# Patient Record
Sex: FEMALE | Race: WHITE | NOT HISPANIC OR LATINO | Employment: STUDENT | URBAN - NONMETROPOLITAN AREA
[De-identification: names, ages, dates, MRNs, and addresses within clinical notes are randomized per-mention and may not be internally consistent; named-entity substitution may affect disease eponyms.]

---
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
## 2022-01-19 ENCOUNTER — TELEPHONE (OUTPATIENT)
Dept: OBSTETRICS AND GYNECOLOGY | Facility: CLINIC | Age: 22
End: 2022-01-19

## 2022-06-25 ENCOUNTER — HOSPITAL ENCOUNTER (EMERGENCY)
Facility: HOSPITAL | Age: 22
Discharge: HOME | End: 2022-06-25

## 2022-06-25 VITALS
OXYGEN SATURATION: 97 % | HEART RATE: 98 BPM | DIASTOLIC BLOOD PRESSURE: 61 MMHG | TEMPERATURE: 98.3 F | RESPIRATION RATE: 18 BRPM | SYSTOLIC BLOOD PRESSURE: 106 MMHG

## 2022-06-25 PROCEDURE — 99211 OFF/OP EST MAY X REQ PHY/QHP: CPT

## 2022-12-06 ENCOUNTER — OFFICE VISIT (OUTPATIENT)
Dept: OBSTETRICS AND GYNECOLOGY | Facility: CLINIC | Age: 22
End: 2022-12-06

## 2022-12-06 VITALS
WEIGHT: 163 LBS | BODY MASS INDEX: 27.83 KG/M2 | DIASTOLIC BLOOD PRESSURE: 72 MMHG | SYSTOLIC BLOOD PRESSURE: 118 MMHG | HEIGHT: 64 IN

## 2022-12-06 DIAGNOSIS — N92.6 IRREGULAR PERIODS: Primary | ICD-10-CM

## 2022-12-06 PROBLEM — O09.33 INSUFFICIENT PRENATAL CARE IN THIRD TRIMESTER: Status: RESOLVED | Noted: 2020-01-16 | Resolved: 2022-12-06

## 2022-12-06 PROBLEM — A74.9 CHLAMYDIA INFECTION DURING PREGNANCY: Status: RESOLVED | Noted: 2020-01-16 | Resolved: 2022-12-06

## 2022-12-06 PROBLEM — O99.820 GBS (GROUP B STREPTOCOCCUS CARRIER), +RV CULTURE, CURRENTLY PREGNANT: Status: RESOLVED | Noted: 2020-05-09 | Resolved: 2022-12-06

## 2022-12-06 PROBLEM — O99.333 TOBACCO SMOKING AFFECTING PREGNANCY IN THIRD TRIMESTER: Status: RESOLVED | Noted: 2020-01-17 | Resolved: 2022-12-06

## 2022-12-06 PROBLEM — O09.93 SUPERVISION OF HIGH RISK PREGNANCY IN THIRD TRIMESTER: Status: RESOLVED | Noted: 2020-01-17 | Resolved: 2022-12-06

## 2022-12-06 PROBLEM — O98.313 GENITAL HERPES AFFECTING PREGNANCY IN THIRD TRIMESTER: Status: RESOLVED | Noted: 2020-01-16 | Resolved: 2022-12-06

## 2022-12-06 PROBLEM — O36.5930 POOR FETAL GROWTH AFFECTING MANAGEMENT OF MOTHER IN THIRD TRIMESTER: Status: RESOLVED | Noted: 2020-01-16 | Resolved: 2022-12-06

## 2022-12-06 PROBLEM — A60.09 GENITAL HERPES AFFECTING PREGNANCY IN THIRD TRIMESTER: Status: RESOLVED | Noted: 2020-01-16 | Resolved: 2022-12-06

## 2022-12-06 PROBLEM — O99.213 OBESITY AFFECTING PREGNANCY IN THIRD TRIMESTER: Status: RESOLVED | Noted: 2020-05-07 | Resolved: 2022-12-06

## 2022-12-06 PROBLEM — Z3A.35 35 WEEKS GESTATION OF PREGNANCY: Status: RESOLVED | Noted: 2020-05-11 | Resolved: 2022-12-06

## 2022-12-06 PROBLEM — O98.819 CHLAMYDIA INFECTION DURING PREGNANCY: Status: RESOLVED | Noted: 2020-01-16 | Resolved: 2022-12-06

## 2022-12-06 PROBLEM — O35.FXX0 OMPHALOCELE OF FETUS IN SINGLETON PREGNANCY, ANTEPARTUM: Status: RESOLVED | Noted: 2020-01-16 | Resolved: 2022-12-06

## 2022-12-06 PROBLEM — O99.810 GLUCOSE INTOLERANCE OF PREGNANCY: Status: RESOLVED | Noted: 2020-05-08 | Resolved: 2022-12-06

## 2022-12-06 PROBLEM — O99.323 DRUG USE AFFECTING PREGNANCY IN THIRD TRIMESTER: Status: RESOLVED | Noted: 2020-01-17 | Resolved: 2022-12-06

## 2022-12-06 PROCEDURE — 99213 OFFICE O/P EST LOW 20 MIN: CPT | Performed by: NURSE PRACTITIONER

## 2022-12-06 NOTE — PROGRESS NOTES
Subjective   Steve Alanna Fried is a 22 y.o. period issues      History of Present Illness  LMP: around 2 weeks ago  Pap: never  BC: none    Pt presents with complaints of missing periods.  She reports for the past four months spotting once monthly instead of typical moderate menstrual flow.  She reports unintentional weight gain of around 15 pounds over the last couple of months.  She and significant other are trying to conceive. They have been trying for the past few months.  She has one child conceived without issue and her partner has 6 children.  No medications or elicit drug use.        The following portions of the patient's history were reviewed and updated as appropriate: allergies, current medications, past family history, past medical history, past social history, past surgical history and problem list.    Review of Systems   Constitutional: Negative for chills, diaphoresis, fatigue, fever and unexpected weight change.   Respiratory: Negative for apnea, chest tightness and shortness of breath.    Cardiovascular: Negative for chest pain and palpitations.   Gastrointestinal: Negative for abdominal distention, abdominal pain, constipation and diarrhea.   Genitourinary: Positive for menstrual problem. Negative for decreased urine volume, difficulty urinating, dysuria, enuresis, flank pain, frequency, genital sores, hematuria, pelvic pain, urgency, vaginal bleeding, vaginal discharge and vaginal pain.   Skin: Negative for rash.   Neurological: Negative for headaches.   Psychiatric/Behavioral: Negative for sleep disturbance and suicidal ideas.         Objective   Physical Exam  Vitals and nursing note reviewed.   Constitutional:       General: She is awake. She is not in acute distress.     Appearance: Normal appearance. She is well-developed and well-groomed. She is not ill-appearing, toxic-appearing or diaphoretic.   Cardiovascular:      Rate and Rhythm: Normal rate and regular rhythm.      Heart  sounds: Normal heart sounds.   Pulmonary:      Effort: Pulmonary effort is normal.      Breath sounds: Normal breath sounds.   Skin:     General: Skin is warm and dry.   Neurological:      Mental Status: She is alert and oriented to person, place, and time.   Psychiatric:         Attention and Perception: Attention and perception normal.         Mood and Affect: Mood and affect normal.         Speech: Speech normal.         Behavior: Behavior normal. Behavior is cooperative.           Assessment & Plan   Diagnoses and all orders for this visit:    1. Irregular periods (Primary)  -     Comprehensive Metabolic Panel  -     Hemoglobin A1c  -     TSH  -     Testosterone, F Eqlib & T LC / MS  -     Lipid Panel  -     Follicle Stimulating Hormone  -     Luteinizing Hormone  -     HCG, Serum, Qualitative; Future  -     Prolactin    Encouraged pt to track cycles with smart phone nereyda.  Ovulation predictor kits recommended along with timed intercourse.  Start taking prenatal vitamins.  Encouraged healthy diet and regular physical activity with goal of weight loss.  Weight loss of 15 pounds may help with menstrual regulation.  Encouraged complete smoking cessation and limitation of alcohol consumption.  It typically takes 6 months or more for most couples to conceive.  Try to limit stress and take time for yourself and partner.      The semen analysis of the most effective way to evaluate for male factor infertility, which is the cause of approximately 40% of infertility cases.  We will place the order in the sample was evaluated for 3 parameters; the total number of sperm, the motility of the sperm as well as the morphology of the sperm of the microscope.  If the semen analysis is normal, and there is no further work-up to be done for the male partner.  If the semen analysis is abnormal, after an evaluation by urology as recommended.    Fasting labs ordered.  She will come have them done in am.  Will call with results when  they are available.

## 2022-12-07 ENCOUNTER — LAB (OUTPATIENT)
Dept: LAB | Facility: HOSPITAL | Age: 22
End: 2022-12-07

## 2022-12-07 DIAGNOSIS — N92.6 IRREGULAR PERIODS: ICD-10-CM

## 2022-12-07 LAB
ALBUMIN SERPL-MCNC: 4.2 G/DL (ref 3.5–5.2)
ALBUMIN/GLOB SERPL: 1.5 G/DL
ALP SERPL-CCNC: 54 U/L (ref 39–117)
ALT SERPL W P-5'-P-CCNC: 16 U/L (ref 1–33)
ANION GAP SERPL CALCULATED.3IONS-SCNC: 9.9 MMOL/L (ref 5–15)
AST SERPL-CCNC: 13 U/L (ref 1–32)
BILIRUB SERPL-MCNC: 0.3 MG/DL (ref 0–1.2)
BUN SERPL-MCNC: 16 MG/DL (ref 6–20)
BUN/CREAT SERPL: 21.9 (ref 7–25)
CALCIUM SPEC-SCNC: 8.9 MG/DL (ref 8.6–10.5)
CHLORIDE SERPL-SCNC: 102 MMOL/L (ref 98–107)
CHOLEST SERPL-MCNC: 128 MG/DL (ref 0–200)
CO2 SERPL-SCNC: 25.1 MMOL/L (ref 22–29)
CREAT SERPL-MCNC: 0.73 MG/DL (ref 0.57–1)
EGFRCR SERPLBLD CKD-EPI 2021: 120.9 ML/MIN/1.73
FSH SERPL-ACNC: 3.31 MIU/ML
GLOBULIN UR ELPH-MCNC: 2.8 GM/DL
GLUCOSE SERPL-MCNC: 100 MG/DL (ref 65–99)
HBA1C MFR BLD: 5.9 % (ref 4.8–5.6)
HCG SERPL QL: NEGATIVE
HDLC SERPL-MCNC: 57 MG/DL (ref 40–60)
LDLC SERPL CALC-MCNC: 60 MG/DL (ref 0–100)
LDLC/HDLC SERPL: 1.09 {RATIO}
LH SERPL-ACNC: 4.91 MIU/ML
POTASSIUM SERPL-SCNC: 4.3 MMOL/L (ref 3.5–5.2)
PROLACTIN SERPL-MCNC: 10.9 NG/ML (ref 4.79–23.3)
PROT SERPL-MCNC: 7 G/DL (ref 6–8.5)
SODIUM SERPL-SCNC: 137 MMOL/L (ref 136–145)
TRIGL SERPL-MCNC: 45 MG/DL (ref 0–150)
TSH SERPL DL<=0.05 MIU/L-ACNC: 1.69 UIU/ML (ref 0.27–4.2)
VLDLC SERPL-MCNC: 11 MG/DL (ref 5–40)

## 2022-12-07 PROCEDURE — 80061 LIPID PANEL: CPT | Performed by: NURSE PRACTITIONER

## 2022-12-07 PROCEDURE — 84443 ASSAY THYROID STIM HORMONE: CPT | Performed by: NURSE PRACTITIONER

## 2022-12-07 PROCEDURE — 84403 ASSAY OF TOTAL TESTOSTERONE: CPT | Performed by: NURSE PRACTITIONER

## 2022-12-07 PROCEDURE — 84703 CHORIONIC GONADOTROPIN ASSAY: CPT

## 2022-12-07 PROCEDURE — 83036 HEMOGLOBIN GLYCOSYLATED A1C: CPT | Performed by: NURSE PRACTITIONER

## 2022-12-07 PROCEDURE — 83001 ASSAY OF GONADOTROPIN (FSH): CPT | Performed by: NURSE PRACTITIONER

## 2022-12-07 PROCEDURE — 84402 ASSAY OF FREE TESTOSTERONE: CPT | Performed by: NURSE PRACTITIONER

## 2022-12-07 PROCEDURE — 80053 COMPREHEN METABOLIC PANEL: CPT | Performed by: NURSE PRACTITIONER

## 2022-12-07 PROCEDURE — 83002 ASSAY OF GONADOTROPIN (LH): CPT | Performed by: NURSE PRACTITIONER

## 2022-12-07 PROCEDURE — 84146 ASSAY OF PROLACTIN: CPT | Performed by: NURSE PRACTITIONER

## 2022-12-07 PROCEDURE — 36415 COLL VENOUS BLD VENIPUNCTURE: CPT

## 2022-12-15 DIAGNOSIS — R73.03 PREDIABETES: ICD-10-CM

## 2022-12-15 DIAGNOSIS — Z78.9 TRYING TO GET PREGNANT: Primary | ICD-10-CM

## 2022-12-15 LAB
TESTOST FREE MFR SERPL: 1.9 % (ref 0.5–2.8)
TESTOST FREE SERPL-MCNC: 0.44 NG/DL (ref 0.1–0.85)
TESTOST SERPL-MCNC: 23.3 NG/DL (ref 10–55)

## 2022-12-15 RX ORDER — METFORMIN HYDROCHLORIDE 750 MG/1
750 TABLET, EXTENDED RELEASE ORAL
Qty: 30 TABLET | Refills: 5 | Status: SHIPPED | OUTPATIENT
Start: 2022-12-15 | End: 2023-01-25 | Stop reason: ALTCHOICE

## 2023-01-25 ENCOUNTER — INITIAL PRENATAL (OUTPATIENT)
Dept: OBSTETRICS AND GYNECOLOGY | Facility: CLINIC | Age: 23
End: 2023-01-25
Payer: COMMERCIAL

## 2023-01-25 ENCOUNTER — LAB (OUTPATIENT)
Dept: LAB | Facility: HOSPITAL | Age: 23
End: 2023-01-25
Payer: COMMERCIAL

## 2023-01-25 VITALS — DIASTOLIC BLOOD PRESSURE: 64 MMHG | BODY MASS INDEX: 28.73 KG/M2 | WEIGHT: 167.4 LBS | SYSTOLIC BLOOD PRESSURE: 108 MMHG

## 2023-01-25 DIAGNOSIS — Z32.00 PREGNANCY EXAMINATION OR TEST, PREGNANCY UNCONFIRMED: ICD-10-CM

## 2023-01-25 DIAGNOSIS — O36.80X0 ENCOUNTER TO DETERMINE FETAL VIABILITY OF PREGNANCY, SINGLE OR UNSPECIFIED FETUS: ICD-10-CM

## 2023-01-25 DIAGNOSIS — F17.210 CIGARETTE SMOKER: ICD-10-CM

## 2023-01-25 DIAGNOSIS — Z34.80 PRENATAL CARE OF MULTIGRAVIDA, ANTEPARTUM: Primary | ICD-10-CM

## 2023-01-25 DIAGNOSIS — Z32.01 POSITIVE PREGNANCY TEST: ICD-10-CM

## 2023-01-25 DIAGNOSIS — Z78.9 DATE OF LAST MENSTRUAL PERIOD (LMP) UNKNOWN: ICD-10-CM

## 2023-01-25 DIAGNOSIS — F12.91 HISTORY OF MARIJUANA USE: ICD-10-CM

## 2023-01-25 DIAGNOSIS — Z86.32 HISTORY OF GESTATIONAL DIABETES: ICD-10-CM

## 2023-01-25 PROBLEM — R82.5 POSITIVE URINE DRUG SCREEN: Status: ACTIVE | Noted: 2020-01-17

## 2023-01-25 PROBLEM — O35.9XX0 FETAL ABNORMALITY AFFECTING MANAGEMENT OF MOTHER: Status: ACTIVE | Noted: 2020-01-16

## 2023-01-25 PROBLEM — Z34.90 PATIENT CURRENTLY PREGNANT: Status: ACTIVE | Noted: 2020-01-17

## 2023-01-25 PROBLEM — D62 POSTOPERATIVE ANEMIA DUE TO ACUTE BLOOD LOSS: Status: ACTIVE | Noted: 2020-05-28

## 2023-01-25 PROBLEM — O09.899 HISTORY OF MATERNAL CHLAMYDIA INFECTION, CURRENTLY PREGNANT: Status: ACTIVE | Noted: 2020-02-17

## 2023-01-25 LAB
ABO GROUP BLD: NORMAL
AMPHET+METHAMPHET UR QL: POSITIVE
AMPHETAMINES UR QL: POSITIVE
B-HCG UR QL: POSITIVE
BARBITURATES UR QL SCN: NEGATIVE
BASOPHILS # BLD AUTO: 0.03 10*3/MM3 (ref 0–0.2)
BASOPHILS NFR BLD AUTO: 0.3 % (ref 0–1.5)
BENZODIAZ UR QL SCN: NEGATIVE
BILIRUB UR QL STRIP: NEGATIVE
BLD GP AB SCN SERPL QL: NEGATIVE
BUPRENORPHINE SERPL-MCNC: NEGATIVE NG/ML
CANNABINOIDS SERPL QL: POSITIVE
CLARITY UR: ABNORMAL
COCAINE UR QL: NEGATIVE
COLOR UR: YELLOW
DEPRECATED RDW RBC AUTO: 39.6 FL (ref 37–54)
EOSINOPHIL # BLD AUTO: 0.07 10*3/MM3 (ref 0–0.4)
EOSINOPHIL NFR BLD AUTO: 0.7 % (ref 0.3–6.2)
ERYTHROCYTE [DISTWIDTH] IN BLOOD BY AUTOMATED COUNT: 12.5 % (ref 12.3–15.4)
EXPIRATION DATE: ABNORMAL
GLUCOSE UR STRIP-MCNC: NEGATIVE MG/DL
HBV SURFACE AG SERPL QL IA: NORMAL
HCG INTACT+B SERPL-ACNC: NORMAL MIU/ML
HCT VFR BLD AUTO: 35.4 % (ref 34–46.6)
HCV AB SER DONR QL: NORMAL
HGB BLD-MCNC: 12.1 G/DL (ref 12–15.9)
HGB UR QL STRIP.AUTO: NEGATIVE
HIV1+2 AB SER QL: NORMAL
IMM GRANULOCYTES # BLD AUTO: 0.08 10*3/MM3 (ref 0–0.05)
IMM GRANULOCYTES NFR BLD AUTO: 0.8 % (ref 0–0.5)
INTERNAL NEGATIVE CONTROL: NEGATIVE
INTERNAL POSITIVE CONTROL: POSITIVE
KETONES UR QL STRIP: NEGATIVE
LEUKOCYTE ESTERASE UR QL STRIP.AUTO: NEGATIVE
LYMPHOCYTES # BLD AUTO: 3.35 10*3/MM3 (ref 0.7–3.1)
LYMPHOCYTES NFR BLD AUTO: 31.8 % (ref 19.6–45.3)
Lab: ABNORMAL
Lab: NORMAL
MCH RBC QN AUTO: 30 PG (ref 26.6–33)
MCHC RBC AUTO-ENTMCNC: 34.2 G/DL (ref 31.5–35.7)
MCV RBC AUTO: 87.6 FL (ref 79–97)
METHADONE UR QL SCN: NEGATIVE
MONOCYTES # BLD AUTO: 0.64 10*3/MM3 (ref 0.1–0.9)
MONOCYTES NFR BLD AUTO: 6.1 % (ref 5–12)
NEUTROPHILS NFR BLD AUTO: 6.36 10*3/MM3 (ref 1.7–7)
NEUTROPHILS NFR BLD AUTO: 60.3 % (ref 42.7–76)
NITRITE UR QL STRIP: NEGATIVE
NRBC BLD AUTO-RTO: 0 /100 WBC (ref 0–0.2)
OPIATES UR QL: NEGATIVE
OXYCODONE UR QL SCN: NEGATIVE
PCP UR QL SCN: NEGATIVE
PH UR STRIP.AUTO: 8 [PH] (ref 5–8)
PLATELET # BLD AUTO: 310 10*3/MM3 (ref 140–450)
PMV BLD AUTO: 9.5 FL (ref 6–12)
PROPOXYPH UR QL: NEGATIVE
PROT UR QL STRIP: ABNORMAL
RBC # BLD AUTO: 4.04 10*6/MM3 (ref 3.77–5.28)
RH BLD: POSITIVE
RPR SER QL: NORMAL
SP GR UR STRIP: 1.03 (ref 1–1.03)
TRICYCLICS UR QL SCN: NEGATIVE
UROBILINOGEN UR QL STRIP: ABNORMAL
WBC NRBC COR # BLD: 10.53 10*3/MM3 (ref 3.4–10.8)

## 2023-01-25 PROCEDURE — 87086 URINE CULTURE/COLONY COUNT: CPT | Performed by: NURSE PRACTITIONER

## 2023-01-25 PROCEDURE — 80306 DRUG TEST PRSMV INSTRMNT: CPT | Performed by: NURSE PRACTITIONER

## 2023-01-25 PROCEDURE — 87591 N.GONORRHOEAE DNA AMP PROB: CPT | Performed by: NURSE PRACTITIONER

## 2023-01-25 PROCEDURE — 87661 TRICHOMONAS VAGINALIS AMPLIF: CPT | Performed by: NURSE PRACTITIONER

## 2023-01-25 PROCEDURE — G0480 DRUG TEST DEF 1-7 CLASSES: HCPCS | Performed by: NURSE PRACTITIONER

## 2023-01-25 PROCEDURE — 36415 COLL VENOUS BLD VENIPUNCTURE: CPT

## 2023-01-25 PROCEDURE — 81025 URINE PREGNANCY TEST: CPT | Performed by: NURSE PRACTITIONER

## 2023-01-25 PROCEDURE — 86803 HEPATITIS C AB TEST: CPT | Performed by: NURSE PRACTITIONER

## 2023-01-25 PROCEDURE — 84702 CHORIONIC GONADOTROPIN TEST: CPT | Performed by: NURSE PRACTITIONER

## 2023-01-25 PROCEDURE — 87491 CHLMYD TRACH DNA AMP PROBE: CPT | Performed by: NURSE PRACTITIONER

## 2023-01-25 PROCEDURE — 81003 URINALYSIS AUTO W/O SCOPE: CPT | Performed by: NURSE PRACTITIONER

## 2023-01-25 PROCEDURE — 80081 OBSTETRIC PANEL INC HIV TSTG: CPT | Performed by: NURSE PRACTITIONER

## 2023-01-25 RX ORDER — ALBUTEROL SULFATE 90 UG/1
2 AEROSOL, METERED RESPIRATORY (INHALATION) EVERY 4 HOURS PRN
COMMUNITY

## 2023-01-25 RX ORDER — PRENATAL VIT NO.126/IRON/FOLIC 28MG-0.8MG
TABLET ORAL DAILY
COMMUNITY

## 2023-01-26 LAB
BACTERIA SPEC AEROBE CULT: NO GROWTH
C TRACH RRNA CVX QL NAA+PROBE: NEGATIVE
N GONORRHOEA RRNA SPEC QL NAA+PROBE: NEGATIVE
RUBV IGG SERPL IA-ACNC: 3.32 INDEX
TRICHOMONAS VAGINALIS PCR: NEGATIVE

## 2023-01-30 LAB
CANNABINOIDS UR QL CFM: NORMAL
CARBOXYTHC/CREAT UR: 136 NG/MG CREAT
LEVEL OF DETECTION:: NORMAL

## 2023-02-03 LAB
AMPHET/CREAT UR: 522 NG/MG CREAT
AMPHETAMINES UR QL CFM: NORMAL
LEVEL OF DETECTION:: NORMAL
MDA/CREAT UR: NOT DETECTED NG/MG CREAT
MDMA/CREAT UR: NOT DETECTED NG/MG CREAT
METHAMPHET/CREAT UR: 1858 NG/MG CREAT

## 2023-03-30 ENCOUNTER — REFERRAL TRIAGE (OUTPATIENT)
Dept: LABOR AND DELIVERY | Facility: HOSPITAL | Age: 23
End: 2023-03-30
Payer: COMMERCIAL

## 2023-04-10 ENCOUNTER — TELEPHONE (OUTPATIENT)
Dept: OBSTETRICS AND GYNECOLOGY | Facility: CLINIC | Age: 23
End: 2023-04-10
Payer: COMMERCIAL

## 2023-04-10 NOTE — TELEPHONE ENCOUNTER
----- Message from SAWYER Moulton sent at 4/5/2023 12:33 PM CDT -----  Can we reach out to pt to reschedule her appt. No Showed yesterday.   ----- Message -----  From: Tiki Cruz APRN  Sent: 3/30/2023   5:20 PM CDT  To: SAWYER Moulton    Pt has an appt with you on 04/04  ----- Message -----  From: Interface, Rad Results [place] In  Sent: 3/30/2023   4:27 AM CDT  To: SAWYER Parks

## 2023-04-19 ENCOUNTER — INITIAL PRENATAL (OUTPATIENT)
Dept: OBSTETRICS AND GYNECOLOGY | Facility: CLINIC | Age: 23
End: 2023-04-19
Payer: COMMERCIAL

## 2023-04-19 ENCOUNTER — LAB (OUTPATIENT)
Dept: LAB | Facility: HOSPITAL | Age: 23
End: 2023-04-19
Payer: COMMERCIAL

## 2023-04-19 VITALS — BODY MASS INDEX: 30.73 KG/M2 | SYSTOLIC BLOOD PRESSURE: 112 MMHG | WEIGHT: 179 LBS | DIASTOLIC BLOOD PRESSURE: 58 MMHG

## 2023-04-19 DIAGNOSIS — N75.1 BARTHOLIN'S GLAND ABSCESS: ICD-10-CM

## 2023-04-19 DIAGNOSIS — Z86.19 HISTORY OF HERPES SIMPLEX INFECTION: ICD-10-CM

## 2023-04-19 DIAGNOSIS — M43.17 SPONDYLOLISTHESIS AT L5-S1 LEVEL: ICD-10-CM

## 2023-04-19 DIAGNOSIS — Z98.891 HISTORY OF CLASSICAL CESAREAN SECTION: ICD-10-CM

## 2023-04-19 DIAGNOSIS — O09.32 INSUFFICIENT PRENATAL CARE IN SECOND TRIMESTER: ICD-10-CM

## 2023-04-19 DIAGNOSIS — O09.42 HIGH RISK MULTIGRAVIDA IN SECOND TRIMESTER: ICD-10-CM

## 2023-04-19 DIAGNOSIS — O99.322 DRUG USE AFFECTING PREGNANCY IN SECOND TRIMESTER: ICD-10-CM

## 2023-04-19 DIAGNOSIS — Z3A.22 22 WEEKS GESTATION OF PREGNANCY: Primary | ICD-10-CM

## 2023-04-19 DIAGNOSIS — Z11.3 SCREEN FOR STD (SEXUALLY TRANSMITTED DISEASE): ICD-10-CM

## 2023-04-19 DIAGNOSIS — Z82.79 FAMILY HISTORY OF BIRTH DEFECTS: ICD-10-CM

## 2023-04-19 PROBLEM — O99.213 OBESITY AFFECTING PREGNANCY IN THIRD TRIMESTER: Status: RESOLVED | Noted: 2020-05-07 | Resolved: 2023-04-19

## 2023-04-19 PROBLEM — O09.899 HISTORY OF MATERNAL CHLAMYDIA INFECTION, CURRENTLY PREGNANT: Status: RESOLVED | Noted: 2020-02-17 | Resolved: 2023-04-19

## 2023-04-19 PROBLEM — Z34.90 PATIENT CURRENTLY PREGNANT: Status: RESOLVED | Noted: 2020-01-17 | Resolved: 2023-04-19

## 2023-04-19 PROBLEM — O23.599 BACTERIAL VAGINOSIS IN PREGNANCY: Status: RESOLVED | Noted: 2020-04-22 | Resolved: 2023-04-19

## 2023-04-19 PROBLEM — D62 POSTOPERATIVE ANEMIA DUE TO ACUTE BLOOD LOSS: Status: RESOLVED | Noted: 2020-05-28 | Resolved: 2023-04-19

## 2023-04-19 PROBLEM — O98.819 CHLAMYDIA INFECTION DURING PREGNANCY: Status: RESOLVED | Noted: 2020-01-16 | Resolved: 2023-04-19

## 2023-04-19 PROBLEM — O99.323 DRUG USE AFFECTING PREGNANCY IN THIRD TRIMESTER: Status: RESOLVED | Noted: 2020-01-17 | Resolved: 2023-04-19

## 2023-04-19 PROBLEM — O99.820 GBS (GROUP B STREPTOCOCCUS CARRIER), +RV CULTURE, CURRENTLY PREGNANT: Status: RESOLVED | Noted: 2020-05-09 | Resolved: 2023-04-19

## 2023-04-19 PROBLEM — O35.9XX0 FETAL ABNORMALITY AFFECTING MANAGEMENT OF MOTHER: Status: RESOLVED | Noted: 2020-01-16 | Resolved: 2023-04-19

## 2023-04-19 PROBLEM — O99.810 GLUCOSE INTOLERANCE OF PREGNANCY: Status: RESOLVED | Noted: 2020-05-08 | Resolved: 2023-04-19

## 2023-04-19 PROBLEM — A74.9 CHLAMYDIA INFECTION DURING PREGNANCY: Status: RESOLVED | Noted: 2020-01-16 | Resolved: 2023-04-19

## 2023-04-19 PROBLEM — B96.89 BACTERIAL VAGINOSIS IN PREGNANCY: Status: RESOLVED | Noted: 2020-04-22 | Resolved: 2023-04-19

## 2023-04-19 LAB
AMPHET+METHAMPHET UR QL: NEGATIVE
AMPHETAMINES UR QL: NEGATIVE
BARBITURATES UR QL SCN: NEGATIVE
BENZODIAZ UR QL SCN: NEGATIVE
BUPRENORPHINE SERPL-MCNC: NEGATIVE NG/ML
CANNABINOIDS SERPL QL: POSITIVE
COCAINE UR QL: NEGATIVE
METHADONE UR QL SCN: NEGATIVE
OPIATES UR QL: NEGATIVE
OXYCODONE UR QL SCN: NEGATIVE
PCP UR QL SCN: NEGATIVE
PROPOXYPH UR QL: NEGATIVE
TRICYCLICS UR QL SCN: NEGATIVE

## 2023-04-19 PROCEDURE — 87661 TRICHOMONAS VAGINALIS AMPLIF: CPT | Performed by: NURSE PRACTITIONER

## 2023-04-19 PROCEDURE — 87491 CHLMYD TRACH DNA AMP PROBE: CPT | Performed by: NURSE PRACTITIONER

## 2023-04-19 PROCEDURE — 87591 N.GONORRHOEAE DNA AMP PROB: CPT | Performed by: NURSE PRACTITIONER

## 2023-04-19 PROCEDURE — 80306 DRUG TEST PRSMV INSTRMNT: CPT | Performed by: NURSE PRACTITIONER

## 2023-04-19 PROCEDURE — G0480 DRUG TEST DEF 1-7 CLASSES: HCPCS | Performed by: NURSE PRACTITIONER

## 2023-04-19 RX ORDER — PRENATAL WITH FERROUS FUM AND FOLIC ACID 3080; 920; 120; 400; 22; 1.84; 3; 20; 10; 1; 12; 200; 27; 25; 2 [IU]/1; [IU]/1; MG/1; [IU]/1; MG/1; MG/1; MG/1; MG/1; MG/1; MG/1; UG/1; MG/1; MG/1; MG/1; MG/1
1 TABLET ORAL DAILY
Qty: 30 TABLET | Refills: 11 | Status: SHIPPED | OUTPATIENT
Start: 2023-04-19

## 2023-04-19 NOTE — PROGRESS NOTES
[facility]  Obstetrics  Date of Service: 2023    CHIEF COMPLAINT:  New prenatal visit    HISTORY OF PRESENT ILLNESS:  Steve Fried is a 22 y.o. y/o  at 22w1d by LMP (No LMP recorded (lmp unknown). Patient is pregnant.).  This was a planned pregnancy and the patient is alone today.  She denies any vaginal bleeding.  She complains of left sided genital mass which has been present since February. She was treated with oral antibiotics while in [facility] with no relief in genital mass.  She needs prenatal vitamins sent into her pharmacy.    REVIEW OF SYSTEMS  Review of Systems   Constitutional: Negative for activity change, appetite change, diaphoresis, fatigue, unexpected weight gain and unexpected weight loss.   Respiratory: Negative for apnea, chest tightness and shortness of breath.    Cardiovascular: Negative for chest pain and palpitations.   Gastrointestinal: Negative for abdominal distention, abdominal pain, constipation and diarrhea.   Genitourinary: Positive for genital sores. Negative for amenorrhea, breast discharge, breast lump, breast pain, decreased libido, decreased urine volume, difficulty urinating, dyspareunia, dysuria, flank pain, frequency, hematuria, pelvic pain, pelvic pressure, urgency, urinary incontinence, vaginal bleeding, vaginal discharge and vaginal pain.   Musculoskeletal: Negative for myalgias.   Skin: Negative for color change, dry skin and skin lesions.   Neurological: Negative for light-headedness and headache.   Psychiatric/Behavioral: Negative for agitation, dysphoric mood, sleep disturbance, depressed mood and stress. The patient is not nervous/anxious.        PRENATAL RISK FACTORS   Problems (from 25 to present)     Problem Noted Resolved    Insufficient prenatal care in second trimester 2023 by Tiki Cruz APRN No    Drug use affecting pregnancy in second trimester 2023 by Tiki Cruz  SAWYER Lutz No    High risk multigravida in second trimester 2023 by Tiki Cruz APRN No    Family history of birth defects 2023 by Tiki Cruz APRN No    History of classical  section 2023 by Tiki Cruz APRN No    History of herpes simplex infection 2020 by Jazmine Astorga MA No    Overview Addendum 2023  1:07 PM by Rosario Fatima RN     HSV IgG positive 20  Hx of being treated for genital HSV - 2019, tx'd in ER at [facility]               Spondylolisthesis at L5-S1 level 2018 by Jazmine Astorga MA No    Overview Addendum 2023  1:07 PM by Rosario Fatima, RN     S/p spinal fusion 2018 ([facility])  S/p spinal fusion 2018 ([facility])                 DATING CRITERIA:  LMP unknown  1TUS (2023 at 19w0d) -- KEKE 2023    OBSTETRIC HISTORY:  OB History    Para Term  AB Living   2 1 1     1   SAB IAB Ectopic Molar Multiple Live Births             1      # Outcome Date GA Lbr Jose Enrique/2nd Weight Sex Delivery Anes PTL Lv   2 Current            1 Term 20 37w3d   M CS-[facility] N [facility]      Complications: Two vessel cord, Fetal omphalocele during pregnancy, antepartum, IUGR (intrauterine growth restriction) affecting care of mother, Gestational diabetes, Tobacco use affecting pregnancy, antepartum     GYN HISTORY:      Last pap smear:   Last Completed Pap Smear     This patient has no relevant Health Maintenance data.        Denies h/o gynecologic surgeries, including biopsies of the cervix    PAST MEDICAL HISTORY:  Past Medical History:   Diagnosis Date   • Anemia    • Anxiety    • Asthma 2013   • Attention deficit disorder 2020    dx'd in 1st grade, previously on adderall dx'd in 1st grade, previously on adderall   • Bipolar disorder    • Chlamydia    • Depression    • Gestational diabetes    • Herpes    • Myopia    • Spondylolisthesis at L5-S1 level 2018    S/p spinal fusion  2018 ([facility])   • Substance abuse      PAST SURGICAL HISTORY:  Past Surgical History:   Procedure Laterality Date   • ADENOIDECTOMY     •  SECTION     • SPINAL FUSION  2017    L5-S1   • TONSILLECTOMY     • WISDOM TOOTH EXTRACTION       FAMILY HISTORY:  Family History   Problem Relation Age of Onset   • Fibromyalgia Mother    • Hypermobility Mother    • Hypertension Mother    • Irregular heart beat Mother    • Bipolar disorder Mother    • ADD / ADHD Mother    • No Known Problems Father    • ADD / ADHD Sister    • Bipolar disorder Sister    • Miscarriages / Stillbirths Sister    • Asthma Brother    • ADD / ADHD Brother    • No Known Problems Maternal Grandmother    • No Known Problems Maternal Grandfather    • Cancer Paternal Grandmother    • Cataracts Paternal Grandmother    • Breast cancer Paternal Grandmother    • Diabetes Paternal Grandfather    • Cancer Paternal Grandfather    • Ovarian cancer Neg Hx    • Uterine cancer Neg Hx    • Colon cancer Neg Hx      SOCIAL HISTORY:  Social History     Socioeconomic History   • Marital status: Single   Tobacco Use   • Smoking status: Every Day     Packs/day: 0.25     Types: Cigarettes   • Smokeless tobacco: Never   • Tobacco comments:     1-2 cigarettes per day    Vaping Use   • Vaping Use: Never used   Substance and Sexual Activity   • Alcohol use: No   • Drug use: Not Currently   • Sexual activity: Yes     Partners: Male     GENETIC SCREENING:  Age >34 yo as of KEKE: no  Thalassemia: no  NTD: no  CHD: no  Down Syndrome/MR/Fragile X/Autism: no  Ashkenazi [employer]an with Robert-Sachs, [place], familial dysautonomia: no  Sickle cell disease or trait: no  Hemophilia: no  Muscular dystrophy: no  Cystic fibrosis: no  Nieves's chorea: no  Birth defects: yes, her son was born with omphacele  Genetic/chromosomal disorders: yes, first cousin has charge syndrome    INFECTION HISTORY:  TB exposure: no  HSV: yes  Illness since LMP: no  Prior GBS infected child: no  STIs: yes,  treated in the past     ALLERGIES:  Allergies   Allergen Reactions   • Prozac [Fluoxetine Hcl] Nausea And Vomiting   • Lyrica [Pregabalin] Rash       MEDICATIONS:  Prior to Admission medications    Medication Sig Start Date End Date Taking? Authorizing Provider   albuterol sulfate  (90 Base) MCG/ACT inhaler Inhale 2 puffs Every 4 (Four) Hours As Needed for Wheezing.   Yes Opal Edmonds MD   prenatal vitamin (prenatal, CLASSIC, vitamin) tablet Take  by mouth Daily.   Yes ProviderOpal MD       PHYSICAL EXAM:   /58   Wt 81.2 kg (179 lb)   LMP  (LMP Unknown)   BMI 30.73 kg/m²   General: Alert, healthy, no distress, well nourished and well developed.  Neurologic: Alert, oriented to person, place, and time.  Gait normal.  Cranial nerves II-XII grossly intact.  HEENT: Normocephalic, atraumatic.  Extraocular muscles intact, pupils equal and reactive x2.    Teeth: Normal hygiene.  Neck: Supple, no adenopathy, thyroid normal size, non-tender, without nodularity, trachea midline.  Lungs: Normal respiratory effort.  Clear to auscultation bilaterally.  No wheezes, rhonci, or rales.  Heart: Regular rate and rhythm.  No murmer, rub or gallop.  Abdomen: Soft, non-tender, non-distended,no masses, no hepatosplenomegaly, no hernia.  Skin: No rash, no lesions.  Extremities: No cyanosis, clubbing or edema.    GYN Exam: Large 5 cm prominent left bartholin gland noted.  Pt agreeable to I&D today.          Incision and Drainage of Left Bartholin Gland Abscess    Risks and Benefits reviewed:  Yes  Allergies reviewed:    Yes  Consent obtained:    Yes, signed     Area cleansed with betadine solution.  11 blade used to make small incision into left bartholin gland.  Quick return of large amount of brown purulent drainage admixed with blood.  Pt tolerated procedure well.    Follow up in clinic for GYN exam in 1 week.            Reviewed preliminary dating/fetal anatomy scan results 03/28/2023: INDICATION: Second  trimester pregnancy. Fetal anatomy survey     FINDINGS:  Single intrauterine gestation with fetus in a cephalic  presentation.  Cervical length 5.2 cm. Internal os appears closed.  Posterior placenta. No definite placenta previa.  A normal amount of amniotic fluid is present with an amniotic  fluid index of 10.4 cm and a maximal vertical pocket depth of 2.2  cm.  Fetal cardiac activity at 141 bpm.  Complete fetal survey unremarkable.  Three-vessel umbilical cord.  May fetus.  Gestational age is determined by biparietal diameter, femur  length and abdominal circumference measurements 19 weeks 0 days  has a corresponding ultrasound KEKE of 2023 and an estimated  fetal weight of 271 g.     IMPRESSION:  Single approximately 19 week intrauterine gestation as above.       IMPRESSION:  Steve Fried is a 22 y.o.  at 22w1d for a new prenatal visit.    PLAN:  1.  IUP at 22w1d  - Prenatal labs reviewed  - Genetic testing, including cystic fibrosis, was discussed and patient declines  - Start prenatal vitamins  - Weight gain counseling performed.   - Pregravid BMI 25-29.9: Recommend 15-25 lb  - Return to clinic in 1 week, then 4 weeks for return prenatal visit  - Reviewed COVID-19 visitation policy  - Reviewed COVID-19 precautions     Diagnosis Plan   1. 22 weeks gestation of pregnancy        2. High risk multigravida in second trimester        3. History of classical  section  Recommend early delivery up to OB/GYN provider recomendations      4. Drug use affecting pregnancy in second trimester  Urine Drug Screen - Urine, Clean Catch    UDS + amp/meth, THC. She denies drug use recommended UDS each trimester of pregnancy.       5. Spondylolisthesis at L5-S1 level        6. Insufficient prenatal care in second trimester  [facility] into [facility] due to being incarcerated       7. History of herpes simplex infection  Treat prophylactic antiviral 35-36 weeks      8. Family history of birth  defects  First child 2vc, large omphacele, IUGR        Tiki Cruz, SAWYER  4/19/2023  15:16 CDT

## 2023-04-19 NOTE — ASSESSMENT & PLAN NOTE
CS- [facility] 05/26/2020: Two vessel cord, Fetal omphalocele during pregnancy, antepartum, IUGR (intrauterine growth restriction

## 2023-04-20 PROBLEM — N75.1 BARTHOLIN'S GLAND ABSCESS: Status: ACTIVE | Noted: 2023-04-20

## 2023-04-20 LAB
C TRACH RRNA CVX QL NAA+PROBE: NEGATIVE
N GONORRHOEA RRNA SPEC QL NAA+PROBE: NEGATIVE
TRICHOMONAS VAGINALIS PCR: NEGATIVE

## 2023-04-24 ENCOUNTER — PATIENT OUTREACH (OUTPATIENT)
Dept: LABOR AND DELIVERY | Facility: HOSPITAL | Age: 23
End: 2023-04-24
Payer: COMMERCIAL

## 2023-04-24 NOTE — OUTREACH NOTE
[facility]  Unable to Reach       Questions/Answers    Flowsheet Row Responses   Pending Outreach Confirm Patient Interest   Call Attempt First   Outcome No answer/busy   Next Call Attempt Date 05/01/23        Tried calling patient to confirm interest but was unable to reach. Will try again for a second time on May 1st.  Blanca Thomas RN  Maternity Nurse Navigator    4/24/2023, 15:20 CDT

## 2023-04-28 LAB
CANNABINOIDS UR QL CFM: NORMAL
CARBOXYTHC/CREAT UR: 19 NG/MG CREAT
LEVEL OF DETECTION:: NORMAL

## 2023-05-04 ENCOUNTER — PATIENT OUTREACH (OUTPATIENT)
Dept: LABOR AND DELIVERY | Facility: HOSPITAL | Age: 23
End: 2023-05-04
Payer: COMMERCIAL

## 2023-05-04 NOTE — OUTREACH NOTE
[facility]  Enrollment    Current Estimated Gestational Age: 24w2d    Questions/Answers    Flowsheet Row Responses   Would like to participate? No              Denise Thomas RN  Maternity Nurse Navigator    5/4/2023, 16:10 CDT

## 2023-05-30 ENCOUNTER — ROUTINE PRENATAL (OUTPATIENT)
Dept: OBSTETRICS AND GYNECOLOGY | Facility: CLINIC | Age: 23
End: 2023-05-30
Payer: COMMERCIAL

## 2023-05-30 VITALS — WEIGHT: 189.4 LBS | SYSTOLIC BLOOD PRESSURE: 114 MMHG | DIASTOLIC BLOOD PRESSURE: 70 MMHG | BODY MASS INDEX: 32.51 KG/M2

## 2023-05-30 DIAGNOSIS — Z98.891 HISTORY OF CLASSICAL CESAREAN SECTION: ICD-10-CM

## 2023-05-30 DIAGNOSIS — N75.1 BARTHOLIN'S GLAND ABSCESS: ICD-10-CM

## 2023-05-30 DIAGNOSIS — O99.322 DRUG USE AFFECTING PREGNANCY IN SECOND TRIMESTER: ICD-10-CM

## 2023-05-30 DIAGNOSIS — O09.42 HIGH RISK MULTIGRAVIDA IN SECOND TRIMESTER: ICD-10-CM

## 2023-05-30 DIAGNOSIS — Z86.19 HISTORY OF HERPES SIMPLEX INFECTION: ICD-10-CM

## 2023-05-30 DIAGNOSIS — M43.17 SPONDYLOLISTHESIS AT L5-S1 LEVEL: ICD-10-CM

## 2023-05-30 DIAGNOSIS — Z82.79 FAMILY HISTORY OF BIRTH DEFECTS: ICD-10-CM

## 2023-05-30 DIAGNOSIS — O09.32 INSUFFICIENT PRENATAL CARE IN SECOND TRIMESTER: Primary | ICD-10-CM

## 2023-05-30 NOTE — PROGRESS NOTES
CC: Prenatal visit    Steve Fried is a 22 y.o.  at 28w0d.  Doing well.  Denies contractions, LOF, or VB.  Reports good FM.    Patient states due to h/o back surgery when was 17 yo at [facility], had epidural placed higher last pregnancy for CS. Not exactly sure what plan was but did well.     Does have vulvar cyst. Trying warm baths and compresses. States mostly went away, but now back.    /70   Wt 85.9 kg (189 lb 6.4 oz)   LMP  (LMP Unknown)   BMI 32.51 kg/m²   SVE: deferred     Fetal Heart Rate: 145    A/P: Steve Fried is a 22 y.o.  at 28w0d.  - RTC in 2 weeks  - Growth late PNC  - Vulvar cyst: followup for possible I&D if not improved/resolving, will trial po antibiotics; does not appear infected at this time  - H/o classical CS, will plan for RCS 36 to 37 weeks, may need anesthesia consult to make plan for anesthesia, prev. PANKAJ (thoracic epidural catheter at [facility])  - Discussed needs glucose test, importance of completing; does not have ride today but states will compete woon  - Reviewed COVID-19 visitation policy  - Reviewed COVID-19 precautions    Problem List Items Addressed This Visit        Family History    Family history of birth defects       Genitourinary and Reproductive     Bartholin's gland abscess    Relevant Medications    amoxicillin-clavulanate (AUGMENTIN) 875-125 MG per tablet       Gravid and     Insufficient prenatal care in third trimester - Primary    Drug use affecting pregnancy in third trimester    High risk multigravida in third trimester    History of classical  section       Musculoskeletal and Injuries    Spondylolisthesis at L5-S1 level    Overview     S/p spinal fusion 2018 ([facility])  S/p spinal fusion 2018 ([facility])    Formatting of this note might be different from the original.  S/p spinal fusion 2018 ([facility])  Formatting of this note might be different from the original.  S/p spinal fusion 2018 ([facility])             Other    History of herpes simplex infection    Overview     Hx of genital HSV outbreak               Diagnosis Plan   1. Insufficient prenatal care in second trimester        2. Drug use affecting pregnancy in second trimester        3. High risk multigravida in second trimester        4. Family history of birth defects        5. History of classical  section        6. History of herpes simplex infection        7. Spondylolisthesis at L5-S1 level        8. Bartholin's gland abscess          Joann Washington DO  2023  13:50 CDT

## 2023-06-01 RX ORDER — AMOXICILLIN AND CLAVULANATE POTASSIUM 875; 125 MG/1; MG/1
1 TABLET, FILM COATED ORAL 2 TIMES DAILY
Qty: 5 TABLET | Refills: 10 | Status: SHIPPED | OUTPATIENT
Start: 2023-06-01

## 2023-06-02 ENCOUNTER — HOSPITAL ENCOUNTER (EMERGENCY)
Facility: HOSPITAL | Age: 23
Discharge: HOME | End: 2023-06-02
Attending: FAMILY MEDICINE
Payer: COMMERCIAL

## 2023-06-02 VITALS
WEIGHT: 185 LBS | BODY MASS INDEX: 31.58 KG/M2 | HEIGHT: 64 IN | OXYGEN SATURATION: 99 % | SYSTOLIC BLOOD PRESSURE: 102 MMHG | TEMPERATURE: 97.6 F | HEART RATE: 108 BPM | RESPIRATION RATE: 18 BRPM | DIASTOLIC BLOOD PRESSURE: 63 MMHG

## 2023-06-02 DIAGNOSIS — T78.40XA ALLERGIC REACTION, INITIAL ENCOUNTER: Primary | ICD-10-CM

## 2023-06-02 LAB
ALBUMIN SERPL-MCNC: 3.6 G/DL (ref 3.5–5.2)
ALBUMIN/GLOB SERPL: 1.2 G/DL
ALP SERPL-CCNC: 112 U/L (ref 39–117)
ALT SERPL W P-5'-P-CCNC: 8 U/L (ref 1–33)
ANION GAP SERPL CALCULATED.3IONS-SCNC: 12 MMOL/L (ref 5–15)
AST SERPL-CCNC: 10 U/L (ref 1–32)
BASOPHILS # BLD AUTO: 0.03 10*3/MM3 (ref 0–0.2)
BASOPHILS NFR BLD AUTO: 0.2 % (ref 0–1.5)
BILIRUB SERPL-MCNC: 0.4 MG/DL (ref 0–1.2)
BUN SERPL-MCNC: 10 MG/DL (ref 6–20)
BUN/CREAT SERPL: 27.8 (ref 7–25)
CALCIUM SPEC-SCNC: 8.5 MG/DL (ref 8.6–10.5)
CHLORIDE SERPL-SCNC: 103 MMOL/L (ref 98–107)
CO2 SERPL-SCNC: 23 MMOL/L (ref 22–29)
CREAT SERPL-MCNC: 0.36 MG/DL (ref 0.57–1)
DEPRECATED RDW RBC AUTO: 38.7 FL (ref 37–54)
EGFRCR SERPLBLD CKD-EPI 2021: 149.3 ML/MIN/1.73
EOSINOPHIL # BLD AUTO: 0.02 10*3/MM3 (ref 0–0.4)
EOSINOPHIL NFR BLD AUTO: 0.1 % (ref 0.3–6.2)
ERYTHROCYTE [DISTWIDTH] IN BLOOD BY AUTOMATED COUNT: 12 % (ref 12.3–15.4)
GLOBULIN UR ELPH-MCNC: 3.1 GM/DL
GLUCOSE SERPL-MCNC: 112 MG/DL (ref 65–99)
HCT VFR BLD AUTO: 32.6 % (ref 34–46.6)
HGB BLD-MCNC: 11.2 G/DL (ref 12–15.9)
HOLD SPECIMEN: NORMAL
IMM GRANULOCYTES # BLD AUTO: 0.09 10*3/MM3 (ref 0–0.05)
IMM GRANULOCYTES NFR BLD AUTO: 0.7 % (ref 0–0.5)
LYMPHOCYTES # BLD AUTO: 1.09 10*3/MM3 (ref 0.7–3.1)
LYMPHOCYTES NFR BLD AUTO: 7.9 % (ref 19.6–45.3)
MCH RBC QN AUTO: 30.1 PG (ref 26.6–33)
MCHC RBC AUTO-ENTMCNC: 34.4 G/DL (ref 31.5–35.7)
MCV RBC AUTO: 87.6 FL (ref 79–97)
MONOCYTES # BLD AUTO: 0.47 10*3/MM3 (ref 0.1–0.9)
MONOCYTES NFR BLD AUTO: 3.4 % (ref 5–12)
NEUTROPHILS NFR BLD AUTO: 12.13 10*3/MM3 (ref 1.7–7)
NEUTROPHILS NFR BLD AUTO: 87.7 % (ref 42.7–76)
NRBC BLD AUTO-RTO: 0 /100 WBC (ref 0–0.2)
PLATELET # BLD AUTO: 276 10*3/MM3 (ref 140–450)
PMV BLD AUTO: 9.4 FL (ref 6–12)
POTASSIUM SERPL-SCNC: 4 MMOL/L (ref 3.5–5.2)
PROT SERPL-MCNC: 6.7 G/DL (ref 6–8.5)
RBC # BLD AUTO: 3.72 10*6/MM3 (ref 3.77–5.28)
SODIUM SERPL-SCNC: 138 MMOL/L (ref 136–145)
WBC NRBC COR # BLD: 13.83 10*3/MM3 (ref 3.4–10.8)
WHOLE BLOOD HOLD COAG: NORMAL

## 2023-06-02 PROCEDURE — 85025 COMPLETE CBC W/AUTO DIFF WBC: CPT | Performed by: FAMILY MEDICINE

## 2023-06-02 PROCEDURE — 96374 THER/PROPH/DIAG INJ IV PUSH: CPT

## 2023-06-02 PROCEDURE — 80053 COMPREHEN METABOLIC PANEL: CPT | Performed by: FAMILY MEDICINE

## 2023-06-02 PROCEDURE — 96375 TX/PRO/DX INJ NEW DRUG ADDON: CPT

## 2023-06-02 PROCEDURE — 25010000002 ONDANSETRON PER 1 MG: Performed by: FAMILY MEDICINE

## 2023-06-02 PROCEDURE — 25010000002 DIPHENHYDRAMINE PER 50 MG: Performed by: FAMILY MEDICINE

## 2023-06-02 PROCEDURE — 96361 HYDRATE IV INFUSION ADD-ON: CPT

## 2023-06-02 PROCEDURE — 25010000002 METHYLPREDNISOLONE PER 125 MG: Performed by: FAMILY MEDICINE

## 2023-06-02 PROCEDURE — 99283 EMERGENCY DEPT VISIT LOW MDM: CPT

## 2023-06-02 RX ORDER — DIPHENHYDRAMINE HYDROCHLORIDE 50 MG/ML
25 INJECTION INTRAMUSCULAR; INTRAVENOUS ONCE
Status: COMPLETED | OUTPATIENT
Start: 2023-06-02 | End: 2023-06-02

## 2023-06-02 RX ORDER — FAMOTIDINE 10 MG/ML
20 INJECTION, SOLUTION INTRAVENOUS EVERY 12 HOURS SCHEDULED
Status: DISCONTINUED | OUTPATIENT
Start: 2023-06-02 | End: 2023-06-02 | Stop reason: HOSPADM

## 2023-06-02 RX ORDER — SODIUM CHLORIDE 0.9 % (FLUSH) 0.9 %
10 SYRINGE (ML) INJECTION AS NEEDED
Status: DISCONTINUED | OUTPATIENT
Start: 2023-06-02 | End: 2023-06-02 | Stop reason: HOSPADM

## 2023-06-02 RX ORDER — ONDANSETRON 2 MG/ML
4 INJECTION INTRAMUSCULAR; INTRAVENOUS ONCE
Status: COMPLETED | OUTPATIENT
Start: 2023-06-02 | End: 2023-06-02

## 2023-06-02 RX ORDER — METHYLPREDNISOLONE SODIUM SUCCINATE 125 MG/2ML
80 INJECTION, POWDER, LYOPHILIZED, FOR SOLUTION INTRAMUSCULAR; INTRAVENOUS ONCE
Status: COMPLETED | OUTPATIENT
Start: 2023-06-02 | End: 2023-06-02

## 2023-06-02 RX ORDER — PREDNISONE 20 MG/1
20 TABLET ORAL DAILY
Qty: 5 TABLET | Refills: 0 | Status: SHIPPED | OUTPATIENT
Start: 2023-06-02

## 2023-06-02 RX ORDER — DIPHENHYDRAMINE HCL 25 MG
25 TABLET ORAL EVERY 6 HOURS PRN
Qty: 15 TABLET | Refills: 0 | Status: SHIPPED | OUTPATIENT
Start: 2023-06-02

## 2023-06-02 RX ADMIN — ONDANSETRON 4 MG: 2 INJECTION INTRAMUSCULAR; INTRAVENOUS at 09:06

## 2023-06-02 RX ADMIN — FAMOTIDINE 20 MG: 10 INJECTION INTRAVENOUS at 09:12

## 2023-06-02 RX ADMIN — DIPHENHYDRAMINE HYDROCHLORIDE 25 MG: 50 INJECTION INTRAMUSCULAR; INTRAVENOUS at 09:10

## 2023-06-02 RX ADMIN — METHYLPREDNISOLONE SODIUM SUCCINATE 80 MG: 125 INJECTION INTRAMUSCULAR; INTRAVENOUS at 09:09

## 2023-06-02 RX ADMIN — SODIUM CHLORIDE 1000 ML: 9 INJECTION, SOLUTION INTRAVENOUS at 10:54

## 2023-06-02 RX ADMIN — SODIUM CHLORIDE 1000 ML: 9 INJECTION, SOLUTION INTRAVENOUS at 09:05

## 2023-06-02 NOTE — ED PROVIDER NOTES
Subjective   History of Present Illness  Patient is a 22 years old who is 29 weeks pregnant who presented here today because of rash and swelling on the face that started early this morning when she woke up.  She says she has mild itching and burning.  Had nothing like this before.  And also she says she is so nauseous.    History provided by:  Patient   used: No        Review of Systems   Gastrointestinal: Positive for nausea and vomiting.   Skin: Positive for rash.   All other systems reviewed and are negative.      Past Medical History:   Diagnosis Date   • Anemia    • Anxiety    • Asthma 2013   • Attention deficit disorder 2020    dx'd in 1st grade, previously on adderall dx'd in 1st grade, previously on adderall   • Bipolar disorder    • Chlamydia    • Depression    • Gestational diabetes    • Herpes    • Myopia    • Spondylolisthesis at L5-S1 level 2018    S/p spinal fusion  ([facility])   • Substance abuse        Allergies   Allergen Reactions   • Prozac [Fluoxetine Hcl] Nausea And Vomiting   • Lyrica [Pregabalin] Rash       Past Surgical History:   Procedure Laterality Date   • ADENOIDECTOMY     •  SECTION     • SPINAL FUSION  2017    L5-S1   • TONSILLECTOMY     • WISDOM TOOTH EXTRACTION         Family History   Problem Relation Age of Onset   • Fibromyalgia Mother    • Hypermobility Mother    • Hypertension Mother    • Irregular heart beat Mother    • Bipolar disorder Mother    • ADD / ADHD Mother    • No Known Problems Father    • ADD / ADHD Sister    • Bipolar disorder Sister    • Miscarriages / Stillbirths Sister    • Asthma Brother    • ADD / ADHD Brother    • No Known Problems Maternal Grandmother    • No Known Problems Maternal Grandfather    • Cancer Paternal Grandmother    • Cataracts Paternal Grandmother    • Breast cancer Paternal Grandmother    • Diabetes Paternal Grandfather    • Cancer Paternal Grandfather    • Ovarian cancer Neg Hx    • Uterine  cancer Neg Hx    • Colon cancer Neg Hx        Social History     Socioeconomic History   • Marital status: Single   Tobacco Use   • Smoking status: Every Day     Packs/day: 0.25     Types: Cigarettes   • Smokeless tobacco: Never   • Tobacco comments:     1-2 cigarettes per day    Vaping Use   • Vaping Use: Never used   Substance and Sexual Activity   • Alcohol use: No   • Drug use: Not Currently   • Sexual activity: Yes     Partners: Male           Objective   Physical Exam  Vitals and nursing note reviewed.   Constitutional:       Appearance: Normal appearance. She is normal weight.   HENT:      Head: Normocephalic and atraumatic.      Right Ear: Tympanic membrane, ear canal and external ear normal.      Left Ear: Tympanic membrane and external ear normal.      Nose: Nose normal.   Eyes:      Extraocular Movements: Extraocular movements intact.      Conjunctiva/sclera: Conjunctivae normal.      Pupils: Pupils are equal, round, and reactive to light.   Cardiovascular:      Rate and Rhythm: Normal rate and regular rhythm.      Pulses: Normal pulses.      Heart sounds: Normal heart sounds.   Pulmonary:      Effort: Pulmonary effort is normal.      Breath sounds: Normal breath sounds.   Abdominal:      General: Abdomen is flat. Bowel sounds are normal.      Palpations: Abdomen is soft.   Genitourinary:     General: Normal vulva.   Musculoskeletal:         General: Normal range of motion.      Cervical back: Normal range of motion and neck supple.   Skin:     Capillary Refill: Capillary refill takes less than 2 seconds.      Findings: Rash present.   Neurological:      General: No focal deficit present.      Mental Status: She is alert and oriented to person, place, and time.   Psychiatric:         Mood and Affect: Mood normal.         Behavior: Behavior normal.         Procedures           ED Course            Labs Reviewed   COMPREHENSIVE METABOLIC PANEL - Abnormal; Notable for the following components:       Result  Value    Glucose 112 (*)     Creatinine 0.36 (*)     Calcium 8.5 (*)     BUN/Creatinine Ratio 27.8 (*)     All other components within normal limits    Narrative:     GFR Normal >60  Chronic Kidney Disease <60  Kidney Failure <15     CBC WITH AUTO DIFFERENTIAL - Abnormal; Notable for the following components:    WBC 13.83 (*)     RBC 3.72 (*)     Hemoglobin 11.2 (*)     Hematocrit 32.6 (*)     RDW 12.0 (*)     Neutrophil % 87.7 (*)     Lymphocyte % 7.9 (*)     Monocyte % 3.4 (*)     Eosinophil % 0.1 (*)     Immature Grans % 0.7 (*)     Neutrophils, Absolute 12.13 (*)     Immature Grans, Absolute 0.09 (*)     All other components within normal limits   CBC AND DIFFERENTIAL    Narrative:     The following orders were created for panel order CBC & Differential.  Procedure                               Abnormality         Status                     ---------                               -----------         ------                     CBC Auto Differential[[id number]]        Abnormal            Final result                 Please view results for these tests on the individual orders.   EXTRA TUBES    Narrative:     The following orders were created for panel order Extra Tubes.  Procedure                               Abnormality         Status                     ---------                               -----------         ------                     Gold Top - SST[[id number]]                                   Final result               Light Blue Top[[id number]]                                   Final result                 Please view results for these tests on the individual orders.   GOLD TOP - SST   LIGHT BLUE TOP       No orders to display                                   Medical Decision Making  Patient is a 22 years old who presented here today because of rash in the face and mild swelling.  Patient was given some Solu-Medrol, Pepcid, and Benadryl and the rash improved.  Patient was discharged on some prednisone for  5 days.  Advised to follow-up with OB/GYN in 3 days.  Come back to the ER symptoms get worse.    Allergic reaction, initial encounter: acute illness or injury  Amount and/or Complexity of Data Reviewed  Labs: ordered.      Risk  Prescription drug management.          Final diagnoses:   Allergic reaction, initial encounter       ED Disposition  ED Disposition     ED Disposition   Discharge    Condition   Stable    Comment   --             Provider, No Known  [place] [address]    In 3 days      Joann Washington, [address]  [address]  [place] [address]  [phone]    In 3 days           Medication List      No changes were made to your prescriptions during this visit.          Jefferson Prince MD  06/02/23 [id number]

## 2023-06-02 NOTE — DISCHARGE INSTRUCTIONS
Patient advised to take medication as directed.  Off of the OB/GYN in 3 days.  Come back to the ER symptoms get worse.

## 2023-06-06 ENCOUNTER — ROUTINE PRENATAL (OUTPATIENT)
Dept: OBSTETRICS AND GYNECOLOGY | Facility: CLINIC | Age: 23
End: 2023-06-06
Payer: COMMERCIAL

## 2023-06-06 VITALS — SYSTOLIC BLOOD PRESSURE: 112 MMHG | WEIGHT: 193 LBS | BODY MASS INDEX: 33.13 KG/M2 | DIASTOLIC BLOOD PRESSURE: 54 MMHG

## 2023-06-06 DIAGNOSIS — O35.EXX0 PYELECTASIS OF FETUS ON PRENATAL ULTRASOUND: ICD-10-CM

## 2023-06-06 DIAGNOSIS — O99.323 DRUG USE AFFECTING PREGNANCY IN THIRD TRIMESTER: ICD-10-CM

## 2023-06-06 DIAGNOSIS — O09.43 HIGH RISK MULTIGRAVIDA IN THIRD TRIMESTER: Primary | ICD-10-CM

## 2023-06-06 DIAGNOSIS — N75.0 BARTHOLIN'S CYST: Primary | ICD-10-CM

## 2023-06-06 DIAGNOSIS — B00.9 HERPES VIRUS INFECTION IN MOTHER DURING THIRD TRIMESTER OF PREGNANCY: ICD-10-CM

## 2023-06-06 DIAGNOSIS — Z98.891 HISTORY OF CLASSICAL CESAREAN SECTION: ICD-10-CM

## 2023-06-06 DIAGNOSIS — R82.5 POSITIVE URINE DRUG SCREEN: ICD-10-CM

## 2023-06-06 DIAGNOSIS — Z86.19 HISTORY OF HERPES SIMPLEX INFECTION: ICD-10-CM

## 2023-06-06 DIAGNOSIS — O98.513 HERPES VIRUS INFECTION IN MOTHER DURING THIRD TRIMESTER OF PREGNANCY: ICD-10-CM

## 2023-06-06 DIAGNOSIS — Z82.79 FAMILY HISTORY OF BIRTH DEFECTS: ICD-10-CM

## 2023-06-06 DIAGNOSIS — Z3A.29 29 WEEKS GESTATION OF PREGNANCY: ICD-10-CM

## 2023-06-06 DIAGNOSIS — O09.33 INSUFFICIENT PRENATAL CARE IN THIRD TRIMESTER: ICD-10-CM

## 2023-06-06 DIAGNOSIS — N75.1 BARTHOLIN'S GLAND ABSCESS: ICD-10-CM

## 2023-06-06 DIAGNOSIS — Z82.79 FAMILY HISTORY OF CHROMOSOMAL ANOMALY: ICD-10-CM

## 2023-06-06 DIAGNOSIS — O09.30 LATE PRENATAL CARE: Primary | ICD-10-CM

## 2023-06-06 DIAGNOSIS — M43.17 SPONDYLOLISTHESIS AT L5-S1 LEVEL: ICD-10-CM

## 2023-06-06 PROBLEM — F12.91 HISTORY OF MARIJUANA USE: Status: RESOLVED | Noted: 2020-02-17 | Resolved: 2023-06-06

## 2023-06-06 PROCEDURE — 99214 OFFICE O/P EST MOD 30 MIN: CPT | Performed by: OBSTETRICS & GYNECOLOGY

## 2023-06-06 NOTE — PROGRESS NOTES
CC: Prenatal visit    Steve Fried is a 22 y.o.  at 29w0d.  Doing well.  Denies contractions, LOF, or VB.  Reports good FM.    She states that she did not  her ABX.  She would like to try that before having to have the abscess drained.    /54   Wt 87.5 kg (193 lb)   LMP  (LMP Unknown)   BMI 33.13 kg/m²      Fetal Heart Rate: 137    Prelim US- EFW 1511g (78%ile) w/ AC 86%ile, ALIRIO 14.3 cm, breech, placenta posterior w/o previa, pyelectasis L kidney 6 mm     Problems (from 23 to present)       Problem Noted Resolved    Herpes virus infection in mother during third trimester of pregnancy 2023 by Lolita Miles MD No    Pyelectasis of fetus on prenatal ultrasound 2023 by Lolita Miles MD No    Overview Signed 2023 12:20 PM by Lolita Miles MD     - R kidney 6 mm         Bartholin's gland abscess 2023 by Tiki Cruz APRN No    Insufficient prenatal care in third trimester 2023 by Tiki Cruz APRN No    Drug use affecting pregnancy in third trimester 2023 by Tiki Cruz APRN No    High risk multigravida in third trimester 2023 by Tiki Cruz APRN No    Family history of birth defects 2023 by Tiki Cruz APRN No    History of classical  section 2023 by Tiki Cruz APRN No    History of herpes simplex infection 2020 by Jazmine Astorga MA No    Overview Addendum 2023 12:31 PM by Tiki Cruz APRN     Hx of genital HSV outbreak         Positive urine drug screen 2020 by Rosario Fatima, RN No    Overview Signed 2023  1:07 PM by Rosario Fatima, RN     Formatting of this note might be different from the original.  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  S/p spinal fusion 2018 ([facility])  Formatting of this note might be different from the original.  S/p spinal fusion 2018 ([facility])               A/P: Steve Fried is a 22 y.o.  at 29w0d.  - RTC in 2 weeks w/ 3T labs, Tdap, breastpump script if desired.  - RTC in 4 weeks w/ GS (pyelectasis)  - Discussed that she can try the ABX first.  She wanted to switch the prescription to [facility]; discussed that she needs to call them to switch the prescription.  She will do this and let us know how she feels.     Diagnosis Plan   1. High risk multigravida in third trimester  Glucose, Post 50 Gm Glucola    CBC (No Diff)      2. Insufficient prenatal care in third trimester        3. Drug use affecting pregnancy in third trimester        4. Family history of birth defects        5. History of classical  section        6. History of herpes simplex infection        7. Spondylolisthesis at L5-S1 level        8. Bartholin's gland abscess        9. Family history of chromosomal anomaly        10. Positive urine drug screen        11. Pyelectasis of fetus on prenatal ultrasound  US ob follow up transabdominal approach      12. Herpes virus infection in mother during third trimester of pregnancy        13. 29 weeks gestation of pregnancy          Lolita Miles MD  2023  12:21 CDT

## 2023-07-05 ENCOUNTER — ROUTINE PRENATAL (OUTPATIENT)
Dept: OBSTETRICS AND GYNECOLOGY | Facility: CLINIC | Age: 23
End: 2023-07-05
Payer: COMMERCIAL

## 2023-07-05 VITALS — DIASTOLIC BLOOD PRESSURE: 74 MMHG | BODY MASS INDEX: 34.78 KG/M2 | SYSTOLIC BLOOD PRESSURE: 118 MMHG | WEIGHT: 202.6 LBS

## 2023-07-05 DIAGNOSIS — N75.1 BARTHOLIN'S GLAND ABSCESS: ICD-10-CM

## 2023-07-05 DIAGNOSIS — Z86.19 HISTORY OF HERPES SIMPLEX INFECTION: ICD-10-CM

## 2023-07-05 DIAGNOSIS — O09.33 INSUFFICIENT PRENATAL CARE IN THIRD TRIMESTER: ICD-10-CM

## 2023-07-05 DIAGNOSIS — R82.5 POSITIVE URINE DRUG SCREEN: ICD-10-CM

## 2023-07-05 DIAGNOSIS — B00.9 HERPES VIRUS INFECTION IN MOTHER DURING THIRD TRIMESTER OF PREGNANCY: ICD-10-CM

## 2023-07-05 DIAGNOSIS — Z82.79 FAMILY HISTORY OF CHROMOSOMAL ANOMALY: ICD-10-CM

## 2023-07-05 DIAGNOSIS — O09.43 HIGH RISK MULTIGRAVIDA IN THIRD TRIMESTER: ICD-10-CM

## 2023-07-05 DIAGNOSIS — O35.EXX0 PYELECTASIS OF FETUS ON PRENATAL ULTRASOUND: ICD-10-CM

## 2023-07-05 DIAGNOSIS — M43.17 SPONDYLOLISTHESIS AT L5-S1 LEVEL: ICD-10-CM

## 2023-07-05 DIAGNOSIS — Z82.79 FAMILY HISTORY OF BIRTH DEFECTS: ICD-10-CM

## 2023-07-05 DIAGNOSIS — O98.513 HERPES VIRUS INFECTION IN MOTHER DURING THIRD TRIMESTER OF PREGNANCY: ICD-10-CM

## 2023-07-05 DIAGNOSIS — O99.323 DRUG USE AFFECTING PREGNANCY IN THIRD TRIMESTER: ICD-10-CM

## 2023-07-05 DIAGNOSIS — Z98.891 HISTORY OF CLASSICAL CESAREAN SECTION: Primary | ICD-10-CM

## 2023-07-05 PROCEDURE — 99214 OFFICE O/P EST MOD 30 MIN: CPT | Performed by: STUDENT IN AN ORGANIZED HEALTH CARE EDUCATION/TRAINING PROGRAM

## 2023-07-05 NOTE — PROGRESS NOTES
CC: Prenatal visit    Steve Fried is a 22 y.o.  at 33w1d.  Doing well.  Denies contractions, LOF, or VB.  Reports good FM.    GERD-Tums and milk    Resolution of Bartholin cyst with warm compresses, did not take antibiotic    Inadequate PNC, no visit since , glucose test today    /74   Wt 91.9 kg (202 lb 9.6 oz)   LMP  (LMP Unknown)   BMI 34.78 kg/m²   SVE: deferred     Fetal Heart Rate: 127 us    Prelim US: breech, ALIRIO 14.96 cm,  bpm, posterior placenta, EFW 92.7%ile, AC >97%ile, pyelectasis L 0.67 cm, R 0.45 cm, 2597 g 5#12oz    A/P: Steve Fried is a 22 y.o.  at 33w1d.  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  +THC            Musculoskeletal and Injuries    Spondylolisthesis at L5-S1 level    Overview     S/p spinal fusion 2018 ([facility])  S/p spinal fusion 2018 ([facility])    Formatting of this note might be different from the original.  S/p spinal fusion 2018 ([facility])  Formatting  of this note might be different from the original.  S/p spinal fusion 2018 ([facility])            Other    History of herpes simplex infection    Overview     Hx of genital HSV outbreak         Herpes virus infection in mother during third trimester of pregnancy        Diagnosis Plan   1. Family history of chromosomal anomaly        2. History of herpes simplex infection        3. Spondylolisthesis at L5-S1 level        4. Positive urine drug screen        5. Insufficient prenatal care in third trimester        6. Drug use affecting pregnancy in third trimester        7. High risk multigravida in third trimester        8. Family history of birth defects        9. History of classical  section        10. Bartholin's gland abscess        11. Herpes virus infection in mother during third trimester of pregnancy        12. Pyelectasis of fetus on prenatal ultrasound          Joann Washington DO  2023  20:35 CDT

## 2023-07-12 PROBLEM — O35.EXX0 PYELECTASIS OF FETUS ON PRENATAL ULTRASOUND: Status: RESOLVED | Noted: 2023-06-06 | Resolved: 2023-07-12

## 2023-07-23 ENCOUNTER — HOSPITAL ENCOUNTER (OUTPATIENT)
Facility: HOSPITAL | Age: 23
Discharge: HOME | End: 2023-07-23
Attending: OBSTETRICS & GYNECOLOGY | Admitting: OBSTETRICS & GYNECOLOGY
Payer: COMMERCIAL

## 2023-07-23 VITALS
RESPIRATION RATE: 20 BRPM | TEMPERATURE: 98.5 F | HEART RATE: 112 BPM | SYSTOLIC BLOOD PRESSURE: 118 MMHG | OXYGEN SATURATION: 96 % | DIASTOLIC BLOOD PRESSURE: 56 MMHG

## 2023-07-23 PROCEDURE — 59025 FETAL NON-STRESS TEST: CPT | Performed by: OBSTETRICS & GYNECOLOGY

## 2023-07-23 PROCEDURE — 59025 FETAL NON-STRESS TEST: CPT

## 2023-07-23 PROCEDURE — 96372 THER/PROPH/DIAG INJ SC/IM: CPT

## 2023-07-23 PROCEDURE — 25010000002 BETAMETHASONE ACET & SOD PHOS PER 4 MG: Performed by: OBSTETRICS & GYNECOLOGY

## 2023-07-23 PROCEDURE — G0463 HOSPITAL OUTPT CLINIC VISIT: HCPCS

## 2023-07-23 RX ORDER — BETAMETHASONE SODIUM PHOSPHATE AND BETAMETHASONE ACETATE 3; 3 MG/ML; MG/ML
12 INJECTION, SUSPENSION INTRA-ARTICULAR; INTRALESIONAL; INTRAMUSCULAR; SOFT TISSUE EVERY 24 HOURS
Status: COMPLETED | OUTPATIENT
Start: 2023-07-23 | End: 2023-07-23

## 2023-07-23 RX ADMIN — BETAMETHASONE SODIUM PHOSPHATE AND BETAMETHASONE ACETATE 12 MG: 3; 3 INJECTION, SUSPENSION INTRA-ARTICULAR; INTRALESIONAL; INTRAMUSCULAR at 14:48

## 2023-07-23 NOTE — NON STRESS TEST
Steve Fried, a  at 35w5d with an KEKE of 2023, by Ultrasound, was seen at [facility] LABOR DELIVERY for a nonstress test.    Chief Complaint   Patient presents with    scheduled steroid injection     Patient scheduled for betamethasone injection x2 prior to scheduled  on Tuesday       Patient Active Problem List   Diagnosis    Family history of chromosomal anomaly    Asthma    Attention deficit disorder    History of depression    History of herpes simplex infection    Spondylolisthesis at L5-S1 level    Smoker    Overweight    Positive urine drug screen    Insufficient prenatal care in third trimester    Drug use affecting pregnancy in third trimester    High risk multigravida in third trimester    Family history of birth defects    History of classical  section    Bartholin's gland abscess    Herpes virus infection in mother during third trimester of pregnancy       Start Time: 1424  Stop Time: 1444    Interpretation A  Nonstress Test Interpretation A: Reactive  Comments A: Reviewed with NAGI Nielsen RNC

## 2023-07-23 NOTE — DISCHARGE INSTRUCTIONS
Return to [facility] 7/24/2023 @ 2pm for 2nd betamethasone injection    Return to labor and delivery for regular contractions, if your water breaks, vaginal bleeding, decreased fetal movement.  Keep next scheduled appt and call [phone] for any concerns or problems.

## 2023-07-24 ENCOUNTER — HOSPITAL ENCOUNTER (OUTPATIENT)
Facility: HOSPITAL | Age: 23
Discharge: HOME | End: 2023-07-24
Attending: STUDENT IN AN ORGANIZED HEALTH CARE EDUCATION/TRAINING PROGRAM | Admitting: OBSTETRICS & GYNECOLOGY
Payer: COMMERCIAL

## 2023-07-24 VITALS
HEART RATE: 102 BPM | WEIGHT: 203 LBS | OXYGEN SATURATION: 100 % | BODY MASS INDEX: 34.66 KG/M2 | DIASTOLIC BLOOD PRESSURE: 56 MMHG | HEIGHT: 64 IN | SYSTOLIC BLOOD PRESSURE: 120 MMHG | TEMPERATURE: 98.4 F | RESPIRATION RATE: 18 BRPM

## 2023-07-24 PROCEDURE — 59025 FETAL NON-STRESS TEST: CPT

## 2023-07-24 PROCEDURE — 96372 THER/PROPH/DIAG INJ SC/IM: CPT

## 2023-07-24 PROCEDURE — G0463 HOSPITAL OUTPT CLINIC VISIT: HCPCS

## 2023-07-24 PROCEDURE — 59025 FETAL NON-STRESS TEST: CPT | Performed by: OBSTETRICS & GYNECOLOGY

## 2023-07-24 PROCEDURE — 25010000002 BETAMETHASONE ACET & SOD PHOS PER 4 MG: Performed by: OBSTETRICS & GYNECOLOGY

## 2023-07-24 RX ORDER — OXYTOCIN/0.9 % SODIUM CHLORIDE 30/500 ML
250 PLASTIC BAG, INJECTION (ML) INTRAVENOUS CONTINUOUS
Status: CANCELLED | OUTPATIENT
Start: 2023-07-24 | End: 2023-07-24

## 2023-07-24 RX ORDER — ONDANSETRON 2 MG/ML
4 INJECTION INTRAMUSCULAR; INTRAVENOUS EVERY 6 HOURS PRN
Status: CANCELLED | OUTPATIENT
Start: 2023-07-24

## 2023-07-24 RX ORDER — ACETAMINOPHEN 500 MG
1000 TABLET ORAL ONCE
Status: CANCELLED | OUTPATIENT
Start: 2023-07-24 | End: 2023-07-24

## 2023-07-24 RX ORDER — ONDANSETRON 4 MG/1
4 TABLET, FILM COATED ORAL EVERY 6 HOURS PRN
Status: CANCELLED | OUTPATIENT
Start: 2023-07-24

## 2023-07-24 RX ORDER — LIDOCAINE HYDROCHLORIDE 10 MG/ML
5 INJECTION, SOLUTION EPIDURAL; INFILTRATION; INTRACAUDAL; PERINEURAL AS NEEDED
Status: CANCELLED | OUTPATIENT
Start: 2023-07-24

## 2023-07-24 RX ORDER — KETOROLAC TROMETHAMINE 15 MG/ML
30 INJECTION, SOLUTION INTRAMUSCULAR; INTRAVENOUS ONCE
Status: CANCELLED | OUTPATIENT
Start: 2023-07-24 | End: 2023-07-24

## 2023-07-24 RX ORDER — TRISODIUM CITRATE DIHYDRATE AND CITRIC ACID MONOHYDRATE 500; 334 MG/5ML; MG/5ML
30 SOLUTION ORAL ONCE
Status: CANCELLED | OUTPATIENT
Start: 2023-07-24 | End: 2023-07-24

## 2023-07-24 RX ORDER — BETAMETHASONE SODIUM PHOSPHATE AND BETAMETHASONE ACETATE 3; 3 MG/ML; MG/ML
12 INJECTION, SUSPENSION INTRA-ARTICULAR; INTRALESIONAL; INTRAMUSCULAR; SOFT TISSUE EVERY 24 HOURS
Status: COMPLETED | OUTPATIENT
Start: 2023-07-24 | End: 2023-07-24

## 2023-07-24 RX ORDER — SODIUM CHLORIDE 0.9 % (FLUSH) 0.9 %
3-10 SYRINGE (ML) INJECTION AS NEEDED
Status: CANCELLED | OUTPATIENT
Start: 2023-07-24

## 2023-07-24 RX ORDER — OXYTOCIN/0.9 % SODIUM CHLORIDE 30/500 ML
999 PLASTIC BAG, INJECTION (ML) INTRAVENOUS ONCE
Status: CANCELLED | OUTPATIENT
Start: 2023-07-24

## 2023-07-24 RX ORDER — SODIUM CHLORIDE 0.9 % (FLUSH) 0.9 %
3 SYRINGE (ML) INJECTION EVERY 12 HOURS SCHEDULED
Status: CANCELLED | OUTPATIENT
Start: 2023-07-24

## 2023-07-24 RX ADMIN — BETAMETHASONE SODIUM PHOSPHATE AND BETAMETHASONE ACETATE 12 MG: 3; 3 INJECTION, SUSPENSION INTRA-ARTICULAR; INTRALESIONAL; INTRAMUSCULAR at 14:38

## 2023-07-24 NOTE — DISCHARGE INSTRUCTIONS
Arrive at 5 a.m thru the emergency room stop register then come up to labor and del. Return for vaginal bleeding, decrease fetal movement, water breaks or contractions.

## 2023-07-24 NOTE — NON STRESS TEST
Steve Fried, a  at 35w6d with an KEKE of 2023, by Ultrasound, was seen at [facility] LABOR DELIVERY for a nonstress test.    Chief Complaint   Patient presents with    Non-stress Test       Patient Active Problem List   Diagnosis    Family history of chromosomal anomaly    Asthma    Attention deficit disorder    History of depression    History of herpes simplex infection    Spondylolisthesis at L5-S1 level    Smoker    Overweight    Positive urine drug screen    Insufficient prenatal care in third trimester    Drug use affecting pregnancy in third trimester    High risk multigravida in third trimester    Family history of birth defects    History of classical  section    Bartholin's gland abscess    Herpes virus infection in mother during third trimester of pregnancy       Start Time: 1420  Stop Time:  1440    Interpretation A  Nonstress Test Interpretation A: Reactive  Comments A: Reviewed with A Jaleel RNC

## 2023-07-25 ENCOUNTER — HOSPITAL ENCOUNTER (INPATIENT)
Facility: HOSPITAL | Age: 23
LOS: 2 days | Discharge: HOME | End: 2023-07-27
Attending: STUDENT IN AN ORGANIZED HEALTH CARE EDUCATION/TRAINING PROGRAM | Admitting: STUDENT IN AN ORGANIZED HEALTH CARE EDUCATION/TRAINING PROGRAM
Payer: COMMERCIAL

## 2023-07-25 ENCOUNTER — ANESTHESIA (OUTPATIENT)
Dept: LABOR AND DELIVERY | Facility: HOSPITAL | Age: 23
End: 2023-07-25
Payer: COMMERCIAL

## 2023-07-25 ENCOUNTER — ANESTHESIA EVENT (OUTPATIENT)
Dept: LABOR AND DELIVERY | Facility: HOSPITAL | Age: 23
End: 2023-07-25
Payer: COMMERCIAL

## 2023-07-25 DIAGNOSIS — Z86.19 HISTORY OF HERPES SIMPLEX INFECTION: ICD-10-CM

## 2023-07-25 DIAGNOSIS — B00.9 HERPES VIRUS INFECTION IN MOTHER DURING THIRD TRIMESTER OF PREGNANCY: ICD-10-CM

## 2023-07-25 DIAGNOSIS — Z98.891 HISTORY OF CLASSICAL CESAREAN SECTION: ICD-10-CM

## 2023-07-25 DIAGNOSIS — Z82.79 FAMILY HISTORY OF CHROMOSOMAL ANOMALY: ICD-10-CM

## 2023-07-25 DIAGNOSIS — Z98.891 STATUS POST REPEAT LOW TRANSVERSE CESAREAN SECTION: Primary | ICD-10-CM

## 2023-07-25 DIAGNOSIS — O09.33 INSUFFICIENT PRENATAL CARE IN THIRD TRIMESTER: ICD-10-CM

## 2023-07-25 DIAGNOSIS — O35.EXX0 PYELECTASIS OF FETUS ON PRENATAL ULTRASOUND: ICD-10-CM

## 2023-07-25 DIAGNOSIS — N75.1 BARTHOLIN'S GLAND ABSCESS: ICD-10-CM

## 2023-07-25 DIAGNOSIS — O98.513 HERPES VIRUS INFECTION IN MOTHER DURING THIRD TRIMESTER OF PREGNANCY: ICD-10-CM

## 2023-07-25 DIAGNOSIS — O99.323 DRUG USE AFFECTING PREGNANCY IN THIRD TRIMESTER: ICD-10-CM

## 2023-07-25 DIAGNOSIS — O09.43 HIGH RISK MULTIGRAVIDA IN THIRD TRIMESTER: ICD-10-CM

## 2023-07-25 DIAGNOSIS — M43.17 SPONDYLOLISTHESIS AT L5-S1 LEVEL: ICD-10-CM

## 2023-07-25 LAB
ABO GROUP BLD: NORMAL
AMPHET+METHAMPHET UR QL: NEGATIVE
AMPHETAMINES UR QL: NEGATIVE
BARBITURATES UR QL SCN: NEGATIVE
BENZODIAZ UR QL SCN: NEGATIVE
BLD GP AB SCN SERPL QL: NEGATIVE
BUPRENORPHINE SERPL-MCNC: NEGATIVE NG/ML
CANNABINOIDS SERPL QL: NEGATIVE
COCAINE UR QL: NEGATIVE
DEPRECATED RDW RBC AUTO: 39.8 FL (ref 37–54)
ERYTHROCYTE [DISTWIDTH] IN BLOOD BY AUTOMATED COUNT: 12.5 % (ref 12.3–15.4)
FENTANYL UR-MCNC: NEGATIVE NG/ML
GLUCOSE BLDC GLUCOMTR-MCNC: 132 MG/DL (ref 70–130)
HCT VFR BLD AUTO: 31.2 % (ref 34–46.6)
HGB BLD-MCNC: 10.5 G/DL (ref 12–15.9)
HOLD SPECIMEN: NORMAL
Lab: NORMAL
MCH RBC QN AUTO: 29.5 PG (ref 26.6–33)
MCHC RBC AUTO-ENTMCNC: 33.7 G/DL (ref 31.5–35.7)
MCV RBC AUTO: 87.6 FL (ref 79–97)
METHADONE UR QL SCN: NEGATIVE
OPIATES UR QL: NEGATIVE
OXYCODONE UR QL SCN: NEGATIVE
PCP UR QL SCN: NEGATIVE
PLATELET # BLD AUTO: 286 10*3/MM3 (ref 140–450)
PMV BLD AUTO: 9.6 FL (ref 6–12)
PROPOXYPH UR QL: NEGATIVE
RBC # BLD AUTO: 3.56 10*6/MM3 (ref 3.77–5.28)
RH BLD: POSITIVE
T&S EXPIRATION DATE: NORMAL
TRICYCLICS UR QL SCN: NEGATIVE
WBC NRBC COR # BLD: 16.24 10*3/MM3 (ref 3.4–10.8)

## 2023-07-25 PROCEDURE — 25010000002 OXYTOCIN PER 10 UNITS: Performed by: STUDENT IN AN ORGANIZED HEALTH CARE EDUCATION/TRAINING PROGRAM

## 2023-07-25 PROCEDURE — 25010000002 DIPHENHYDRAMINE PER 50 MG: Performed by: NURSE ANESTHETIST, CERTIFIED REGISTERED

## 2023-07-25 PROCEDURE — 25010000002 CEFAZOLIN PER 500 MG: Performed by: STUDENT IN AN ORGANIZED HEALTH CARE EDUCATION/TRAINING PROGRAM

## 2023-07-25 PROCEDURE — 25010000002 KETOROLAC TROMETHAMINE PER 15 MG: Performed by: STUDENT IN AN ORGANIZED HEALTH CARE EDUCATION/TRAINING PROGRAM

## 2023-07-25 PROCEDURE — 25010000002 MORPHINE PER 10 MG: Performed by: NURSE ANESTHETIST, CERTIFIED REGISTERED

## 2023-07-25 PROCEDURE — 25010000002 PHENYLEPHRINE 10 MG/ML SOLUTION: Performed by: NURSE ANESTHETIST, CERTIFIED REGISTERED

## 2023-07-25 PROCEDURE — 82948 REAGENT STRIP/BLOOD GLUCOSE: CPT

## 2023-07-25 PROCEDURE — 85027 COMPLETE CBC AUTOMATED: CPT | Performed by: STUDENT IN AN ORGANIZED HEALTH CARE EDUCATION/TRAINING PROGRAM

## 2023-07-25 PROCEDURE — 59515 CESAREAN DELIVERY: CPT | Performed by: STUDENT IN AN ORGANIZED HEALTH CARE EDUCATION/TRAINING PROGRAM

## 2023-07-25 PROCEDURE — 25010000002 ONDANSETRON PER 1 MG: Performed by: NURSE ANESTHETIST, CERTIFIED REGISTERED

## 2023-07-25 PROCEDURE — 25010000002 ROPIVACAINE PER 1 MG: Performed by: STUDENT IN AN ORGANIZED HEALTH CARE EDUCATION/TRAINING PROGRAM

## 2023-07-25 PROCEDURE — 94760 N-INVAS EAR/PLS OXIMETRY 1: CPT

## 2023-07-25 PROCEDURE — 25010000002 BUPIVACAINE PF 0.75 % SOLUTION: Performed by: NURSE ANESTHETIST, CERTIFIED REGISTERED

## 2023-07-25 PROCEDURE — 86900 BLOOD TYPING SEROLOGIC ABO: CPT | Performed by: STUDENT IN AN ORGANIZED HEALTH CARE EDUCATION/TRAINING PROGRAM

## 2023-07-25 PROCEDURE — 86850 RBC ANTIBODY SCREEN: CPT | Performed by: STUDENT IN AN ORGANIZED HEALTH CARE EDUCATION/TRAINING PROGRAM

## 2023-07-25 PROCEDURE — 80307 DRUG TEST PRSMV CHEM ANLYZR: CPT | Performed by: STUDENT IN AN ORGANIZED HEALTH CARE EDUCATION/TRAINING PROGRAM

## 2023-07-25 PROCEDURE — 59514 CESAREAN DELIVERY ONLY: CPT

## 2023-07-25 PROCEDURE — 25010000002 KETOROLAC TROMETHAMINE PER 15 MG: Performed by: NURSE ANESTHETIST, CERTIFIED REGISTERED

## 2023-07-25 PROCEDURE — 86901 BLOOD TYPING SEROLOGIC RH(D): CPT | Performed by: STUDENT IN AN ORGANIZED HEALTH CARE EDUCATION/TRAINING PROGRAM

## 2023-07-25 RX ORDER — LIDOCAINE HYDROCHLORIDE 10 MG/ML
5 INJECTION, SOLUTION EPIDURAL; INFILTRATION; INTRACAUDAL; PERINEURAL AS NEEDED
Status: DISCONTINUED | OUTPATIENT
Start: 2023-07-25 | End: 2023-07-25 | Stop reason: HOSPADM

## 2023-07-25 RX ORDER — ONDANSETRON 4 MG/1
4 TABLET, FILM COATED ORAL EVERY 8 HOURS PRN
Status: DISCONTINUED | OUTPATIENT
Start: 2023-07-25 | End: 2023-07-27 | Stop reason: HOSPADM

## 2023-07-25 RX ORDER — SODIUM CHLORIDE, SODIUM LACTATE, POTASSIUM CHLORIDE, CALCIUM CHLORIDE 600; 310; 30; 20 MG/100ML; MG/100ML; MG/100ML; MG/100ML
INJECTION, SOLUTION INTRAVENOUS CONTINUOUS PRN
Status: DISCONTINUED | OUTPATIENT
Start: 2023-07-25 | End: 2023-07-25 | Stop reason: SURG

## 2023-07-25 RX ORDER — BUPIVACAINE HYDROCHLORIDE 7.5 MG/ML
INJECTION, SOLUTION EPIDURAL; RETROBULBAR
Status: COMPLETED | OUTPATIENT
Start: 2023-07-25 | End: 2023-07-25

## 2023-07-25 RX ORDER — OXYTOCIN/0.9 % SODIUM CHLORIDE 30/500 ML
999 PLASTIC BAG, INJECTION (ML) INTRAVENOUS ONCE
Status: COMPLETED | OUTPATIENT
Start: 2023-07-25 | End: 2023-07-25

## 2023-07-25 RX ORDER — OXYCODONE HYDROCHLORIDE 10 MG/1
10 TABLET ORAL EVERY 4 HOURS PRN
Status: DISCONTINUED | OUTPATIENT
Start: 2023-07-25 | End: 2023-07-27 | Stop reason: HOSPADM

## 2023-07-25 RX ORDER — HYDROCORTISONE 25 MG/G
CREAM TOPICAL 3 TIMES DAILY PRN
Status: DISCONTINUED | OUTPATIENT
Start: 2023-07-25 | End: 2023-07-27 | Stop reason: HOSPADM

## 2023-07-25 RX ORDER — MISOPROSTOL 200 UG/1
600 TABLET ORAL ONCE
Status: DISCONTINUED | OUTPATIENT
Start: 2023-07-25 | End: 2023-07-27 | Stop reason: HOSPADM

## 2023-07-25 RX ORDER — ACETAMINOPHEN 500 MG
1000 TABLET ORAL EVERY 6 HOURS
Status: COMPLETED | OUTPATIENT
Start: 2023-07-25 | End: 2023-07-26

## 2023-07-25 RX ORDER — TRISODIUM CITRATE DIHYDRATE AND CITRIC ACID MONOHYDRATE 500; 334 MG/5ML; MG/5ML
30 SOLUTION ORAL ONCE
Status: COMPLETED | OUTPATIENT
Start: 2023-07-25 | End: 2023-07-25

## 2023-07-25 RX ORDER — KETOROLAC TROMETHAMINE 30 MG/ML
INJECTION, SOLUTION INTRAMUSCULAR; INTRAVENOUS AS NEEDED
Status: DISCONTINUED | OUTPATIENT
Start: 2023-07-25 | End: 2023-07-25 | Stop reason: SURG

## 2023-07-25 RX ORDER — KETOROLAC TROMETHAMINE 15 MG/ML
15 INJECTION, SOLUTION INTRAMUSCULAR; INTRAVENOUS EVERY 6 HOURS
Status: COMPLETED | OUTPATIENT
Start: 2023-07-25 | End: 2023-07-26

## 2023-07-25 RX ORDER — OXYTOCIN/0.9 % SODIUM CHLORIDE 30/500 ML
PLASTIC BAG, INJECTION (ML) INTRAVENOUS
Status: COMPLETED
Start: 2023-07-25 | End: 2023-07-25

## 2023-07-25 RX ORDER — OXYTOCIN/0.9 % SODIUM CHLORIDE 30/500 ML
250 PLASTIC BAG, INJECTION (ML) INTRAVENOUS CONTINUOUS
Status: ACTIVE | OUTPATIENT
Start: 2023-07-25 | End: 2023-07-25

## 2023-07-25 RX ORDER — DIPHENHYDRAMINE HYDROCHLORIDE 50 MG/ML
25 INJECTION INTRAMUSCULAR; INTRAVENOUS EVERY 4 HOURS PRN
Status: DISCONTINUED | OUTPATIENT
Start: 2023-07-25 | End: 2023-07-27 | Stop reason: HOSPADM

## 2023-07-25 RX ORDER — ACETAMINOPHEN 325 MG/1
650 TABLET ORAL EVERY 4 HOURS PRN
Status: ACTIVE | OUTPATIENT
Start: 2023-07-25 | End: 2023-07-26

## 2023-07-25 RX ORDER — OXYCODONE HYDROCHLORIDE 5 MG/1
5 TABLET ORAL EVERY 4 HOURS PRN
Status: DISCONTINUED | OUTPATIENT
Start: 2023-07-25 | End: 2023-07-27 | Stop reason: HOSPADM

## 2023-07-25 RX ORDER — CARBOPROST TROMETHAMINE 250 UG/ML
250 INJECTION, SOLUTION INTRAMUSCULAR ONCE
Status: DISCONTINUED | OUTPATIENT
Start: 2023-07-25 | End: 2023-07-27 | Stop reason: HOSPADM

## 2023-07-25 RX ORDER — POLYETHYLENE GLYCOL 3350 17 G/17G
17 POWDER, FOR SOLUTION ORAL DAILY
Status: DISCONTINUED | OUTPATIENT
Start: 2023-07-25 | End: 2023-07-27 | Stop reason: HOSPADM

## 2023-07-25 RX ORDER — ONDANSETRON 4 MG/1
4 TABLET, FILM COATED ORAL EVERY 6 HOURS PRN
Status: DISCONTINUED | OUTPATIENT
Start: 2023-07-25 | End: 2023-07-25 | Stop reason: HOSPADM

## 2023-07-25 RX ORDER — SODIUM CHLORIDE 0.9 % (FLUSH) 0.9 %
3 SYRINGE (ML) INJECTION EVERY 12 HOURS SCHEDULED
Status: DISCONTINUED | OUTPATIENT
Start: 2023-07-25 | End: 2023-07-25 | Stop reason: HOSPADM

## 2023-07-25 RX ORDER — CALCIUM CARBONATE 500 MG/1
1 TABLET, CHEWABLE ORAL EVERY 4 HOURS PRN
Status: DISCONTINUED | OUTPATIENT
Start: 2023-07-25 | End: 2023-07-27 | Stop reason: HOSPADM

## 2023-07-25 RX ORDER — IBUPROFEN 600 MG/1
600 TABLET ORAL EVERY 6 HOURS
Status: DISCONTINUED | OUTPATIENT
Start: 2023-07-26 | End: 2023-07-27 | Stop reason: HOSPADM

## 2023-07-25 RX ORDER — ONDANSETRON 2 MG/ML
4 INJECTION INTRAMUSCULAR; INTRAVENOUS ONCE AS NEEDED
Status: ACTIVE | OUTPATIENT
Start: 2023-07-25 | End: 2023-07-26

## 2023-07-25 RX ORDER — SODIUM CHLORIDE 0.9 % (FLUSH) 0.9 %
3-10 SYRINGE (ML) INJECTION AS NEEDED
Status: DISCONTINUED | OUTPATIENT
Start: 2023-07-25 | End: 2023-07-25 | Stop reason: HOSPADM

## 2023-07-25 RX ORDER — PHENYLEPHRINE HYDROCHLORIDE 10 MG/ML
INJECTION INTRAVENOUS AS NEEDED
Status: DISCONTINUED | OUTPATIENT
Start: 2023-07-25 | End: 2023-07-25 | Stop reason: SURG

## 2023-07-25 RX ORDER — MORPHINE SULFATE 1 MG/ML
INJECTION, SOLUTION EPIDURAL; INTRATHECAL; INTRAVENOUS AS NEEDED
Status: DISCONTINUED | OUTPATIENT
Start: 2023-07-25 | End: 2023-07-25 | Stop reason: SURG

## 2023-07-25 RX ORDER — SIMETHICONE 80 MG
80 TABLET,CHEWABLE ORAL 4 TIMES DAILY
Status: DISCONTINUED | OUTPATIENT
Start: 2023-07-25 | End: 2023-07-27 | Stop reason: HOSPADM

## 2023-07-25 RX ORDER — METHYLERGONOVINE MALEATE 0.2 MG/ML
200 INJECTION INTRAVENOUS ONCE AS NEEDED
Status: DISCONTINUED | OUTPATIENT
Start: 2023-07-25 | End: 2023-07-27 | Stop reason: HOSPADM

## 2023-07-25 RX ORDER — ONDANSETRON 2 MG/ML
4 INJECTION INTRAMUSCULAR; INTRAVENOUS EVERY 6 HOURS PRN
Status: DISCONTINUED | OUTPATIENT
Start: 2023-07-25 | End: 2023-07-27 | Stop reason: HOSPADM

## 2023-07-25 RX ORDER — DOCUSATE SODIUM 100 MG/1
100 CAPSULE, LIQUID FILLED ORAL 2 TIMES DAILY PRN
Status: DISCONTINUED | OUTPATIENT
Start: 2023-07-25 | End: 2023-07-27 | Stop reason: HOSPADM

## 2023-07-25 RX ORDER — BUPIVACAINE HCL/0.9 % NACL/PF 0.1 %
2 PLASTIC BAG, INJECTION (ML) EPIDURAL ONCE
Status: COMPLETED | OUTPATIENT
Start: 2023-07-25 | End: 2023-07-25

## 2023-07-25 RX ORDER — ALUMINA, MAGNESIA, AND SIMETHICONE 2400; 2400; 240 MG/30ML; MG/30ML; MG/30ML
15 SUSPENSION ORAL EVERY 4 HOURS PRN
Status: DISCONTINUED | OUTPATIENT
Start: 2023-07-25 | End: 2023-07-27 | Stop reason: HOSPADM

## 2023-07-25 RX ORDER — ROPIVACAINE HYDROCHLORIDE 5 MG/ML
INJECTION, SOLUTION EPIDURAL; INFILTRATION; PERINEURAL AS NEEDED
Status: DISCONTINUED | OUTPATIENT
Start: 2023-07-25 | End: 2023-07-27 | Stop reason: HOSPADM

## 2023-07-25 RX ORDER — ACETAMINOPHEN 500 MG
1000 TABLET ORAL ONCE
Status: COMPLETED | OUTPATIENT
Start: 2023-07-25 | End: 2023-07-25

## 2023-07-25 RX ORDER — KETOROLAC TROMETHAMINE 30 MG/ML
30 INJECTION, SOLUTION INTRAMUSCULAR; INTRAVENOUS ONCE
Status: DISCONTINUED | OUTPATIENT
Start: 2023-07-25 | End: 2023-07-25 | Stop reason: HOSPADM

## 2023-07-25 RX ORDER — ACETAMINOPHEN 325 MG/1
650 TABLET ORAL EVERY 6 HOURS
Status: DISCONTINUED | OUTPATIENT
Start: 2023-07-26 | End: 2023-07-27 | Stop reason: HOSPADM

## 2023-07-25 RX ORDER — DIPHENHYDRAMINE HCL 25 MG
25 CAPSULE ORAL EVERY 4 HOURS PRN
Status: DISCONTINUED | OUTPATIENT
Start: 2023-07-25 | End: 2023-07-27 | Stop reason: HOSPADM

## 2023-07-25 RX ORDER — PRENATAL VIT/IRON FUM/FOLIC AC 27MG-0.8MG
1 TABLET ORAL DAILY
Status: DISCONTINUED | OUTPATIENT
Start: 2023-07-25 | End: 2023-07-27 | Stop reason: HOSPADM

## 2023-07-25 RX ORDER — ONDANSETRON 2 MG/ML
4 INJECTION INTRAMUSCULAR; INTRAVENOUS EVERY 6 HOURS PRN
Status: DISCONTINUED | OUTPATIENT
Start: 2023-07-25 | End: 2023-07-25 | Stop reason: HOSPADM

## 2023-07-25 RX ORDER — ONDANSETRON 2 MG/ML
INJECTION INTRAMUSCULAR; INTRAVENOUS AS NEEDED
Status: DISCONTINUED | OUTPATIENT
Start: 2023-07-25 | End: 2023-07-25 | Stop reason: SURG

## 2023-07-25 RX ADMIN — SODIUM CHLORIDE, POTASSIUM CHLORIDE, SODIUM LACTATE AND CALCIUM CHLORIDE: 600; 310; 30; 20 INJECTION, SOLUTION INTRAVENOUS at 08:12

## 2023-07-25 RX ADMIN — SODIUM CITRATE AND CITRIC ACID MONOHYDRATE 30 ML: 500; 334 SOLUTION ORAL at 06:05

## 2023-07-25 RX ADMIN — KETOROLAC TROMETHAMINE 15 MG: 15 INJECTION, SOLUTION INTRAMUSCULAR; INTRAVENOUS at 14:51

## 2023-07-25 RX ADMIN — ACETAMINOPHEN 1000 MG: 500 TABLET, FILM COATED ORAL at 17:07

## 2023-07-25 RX ADMIN — Medication 2 G: at 07:21

## 2023-07-25 RX ADMIN — KETOROLAC TROMETHAMINE 30 MG: 30 INJECTION, SOLUTION INTRAMUSCULAR; INTRAVENOUS at 08:09

## 2023-07-25 RX ADMIN — PRENATAL VIT W/ FE FUMARATE-FA TAB 27-0.8 MG 1 TABLET: 27-0.8 TAB at 11:59

## 2023-07-25 RX ADMIN — SIMETHICONE 80 MG: 80 TABLET, CHEWABLE ORAL at 17:07

## 2023-07-25 RX ADMIN — SIMETHICONE 80 MG: 80 TABLET, CHEWABLE ORAL at 11:59

## 2023-07-25 RX ADMIN — BUPIVACAINE HYDROCHLORIDE 1.4 ML: 7.5 INJECTION, SOLUTION EPIDURAL; RETROBULBAR at 07:18

## 2023-07-25 RX ADMIN — Medication 999 ML/HR: at 07:48

## 2023-07-25 RX ADMIN — PHENYLEPHRINE HYDROCHLORIDE 100 MCG: 10 INJECTION, SOLUTION INTRAVENOUS at 07:35

## 2023-07-25 RX ADMIN — ACETAMINOPHEN 1000 MG: 500 TABLET, FILM COATED ORAL at 05:18

## 2023-07-25 RX ADMIN — SIMETHICONE 80 MG: 80 TABLET, CHEWABLE ORAL at 21:15

## 2023-07-25 RX ADMIN — KETOROLAC TROMETHAMINE 15 MG: 15 INJECTION, SOLUTION INTRAMUSCULAR; INTRAVENOUS at 21:15

## 2023-07-25 RX ADMIN — ONDANSETRON 8 MG: 2 INJECTION INTRAMUSCULAR; INTRAVENOUS at 07:05

## 2023-07-25 RX ADMIN — ACETAMINOPHEN 1000 MG: 500 TABLET, FILM COATED ORAL at 11:59

## 2023-07-25 RX ADMIN — SODIUM CHLORIDE, POTASSIUM CHLORIDE, SODIUM LACTATE AND CALCIUM CHLORIDE 1000 ML: 600; 310; 30; 20 INJECTION, SOLUTION INTRAVENOUS at 05:18

## 2023-07-25 RX ADMIN — PHENYLEPHRINE HYDROCHLORIDE 100 MCG: 10 INJECTION, SOLUTION INTRAVENOUS at 07:24

## 2023-07-25 RX ADMIN — DIPHENHYDRAMINE HYDROCHLORIDE 25 MG: 50 INJECTION INTRAMUSCULAR; INTRAVENOUS at 11:51

## 2023-07-25 RX ADMIN — Medication 0.2 MG: at 07:18

## 2023-07-25 RX ADMIN — SODIUM CHLORIDE, POTASSIUM CHLORIDE, SODIUM LACTATE AND CALCIUM CHLORIDE: 600; 310; 30; 20 INJECTION, SOLUTION INTRAVENOUS at 06:45

## 2023-07-25 NOTE — H&P
HISTORY & PHYSICAL - Obstetrics  [facility]    Name: Steve Fried  MRN: [id number]  Location: Room/bed info not found  Date: 2023  CSN: [id number]      CHIEF COMPLAINT: Routine PNC,  section    HISTORY OF PRESENT ILLNESS  Steve Fried is a 22 y.o.  at 33w1d gestational age by 2TUS suggesting Estimated Date of Delivery: 23.  The patient is scheduled for a  section at 36 weeks due to prior classical CS due to child with omphalocele.  Denies LOF.  Denies vaginal bleeding.  Denies contraction.  Regular FM.    Patient denies any chest pain, palpitations, headaches, lightheadedness, shortness of breath, cough, nausea, vomiting, diarrhea, constipation, fever, or chills.    ROS  Review of Systems   Constitutional: Negative.    HENT: Negative.     Respiratory: Negative.     Cardiovascular: Negative.    Gastrointestinal: Negative.    Genitourinary: Negative.    Skin: Negative.    Psychiatric/Behavioral: Negative.       PRENATAL LAB RESULTS  Prenatal labs reviewed.    External Prenatal Results       Pregnancy Outside Results - Transcribed From Office Records - See Scanned Records For Details       Test Value Date Time    ABO  A  23 1309    Rh  Positive  23 1309    Antibody Screen  Negative  23 1309    Varicella IgG  Positive  20 1028    Rubella  3.32 index 23 1309    Hgb  10.4 g/dL 23 1104       11.2 g/dL 23 0903       12.1 g/dL 23 1309    Hct  29.4 % 23 1104       32.6 % 23 0903       35.4 % 23 1309    Glucose Fasting GTT       Glucose Tolerance Test 1 hour       Glucose Tolerance Test 3 hour       Gonorrhea (discrete)  Negative  23 1545       Negative  23 1309    Chlamydia (discrete)  Negative  23 1545       Negative  23 1309    RPR  Non-Reactive  23 1309    VDRL       Syphilis Antibody       HBsAg  Non-Reactive  23 1309    Herpes Simplex Virus PCR        Herpes Simplex VIrus Culture       HIV  Non-Reactive  01/25/23 1309    Hep C RNA Quant PCR       Hep C Antibody  Non-Reactive  01/25/23 1309    AFP       Group B Strep  Negative  07/12/23 1104    GBS Susceptibility to Clindamycin       GBS Susceptibility to Erythromycin       Fetal Fibronectin       Genetic Testing, Maternal Blood                 Drug Screening       Test Value Date Time    Urine Drug Screen       Amphetamine Screen  Negative  04/19/23 1545       Positive  01/25/23 1309       +POSITIVE+  01/25/23 1309    Barbiturate Screen  Negative  04/19/23 1545       Negative  01/25/23 1309    Benzodiazepine Screen  Negative  04/19/23 1545       Negative  01/25/23 1309    Methadone Screen  Negative  04/19/23 1545       Negative  01/25/23 1309    Phencyclidine Screen  Negative  04/19/23 1545       Negative  01/25/23 1309    Opiates Screen  Negative  04/19/23 1545       Negative  01/25/23 1309    THC Screen  Positive  04/19/23 1545       Positive  01/25/23 1309    Cocaine Screen       Propoxyphene Screen  Negative  04/19/23 1545       Negative  01/25/23 1309    Buprenorphine Screen  Negative  04/19/23 1545       Negative  01/25/23 1309    Methamphetamine Screen       Oxycodone Screen  Negative  04/19/23 1545       Negative  01/25/23 1309    Tricyclic Antidepressants Screen  Negative  04/19/23 1545       Negative  01/25/23 1309              Legend    ^: Historical                            PRENATAL RISK FACTORS  1/23 Problems (from 01/25/23 to present)       Problem Noted Resolved    Herpes virus infection in mother during third trimester of pregnancy 6/6/2023 by Lolita Miles MD No    Bartholin's gland abscess 4/20/2023 by Tiki Cruz APRN No    Insufficient prenatal care in third trimester 4/19/2023 by Tiki Cruz APRN No    Drug use affecting pregnancy in third trimester 4/19/2023 by Tiki Cruz APRN No    High risk multigravida in third  trimester 2023 by Tiki Cruz APRN No    Family history of birth defects 2023 by Tiki Cruz APRN No    History of classical  section 2023 by Tiki Cruz APRN No    History of herpes simplex infection 2020 by Jazmine Astorga MA No    Overview Addendum 2023 12:31 PM by Tiki Cruz APRN     Hx of genital HSV outbreak         Positive urine drug screen 2020 by Rosario Fatima, RN No    Overview Signed 2023  1:07 PM by Rosario Fatima, RN     Formatting of this note might be different from the original.  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  S/p spinal fusion 2018 ([facility])  Formatting of this note might be different from the original.  S/p spinal fusion 2018 ([facility])         Pyelectasis of fetus on prenatal ultrasound 2023 by Lolita Miles MD 2023 by Tiki Cruz APRN    Overview Signed 2023 12:20 PM by Lolita Miles MD     - R kidney 6 mm                 DATING SUMMARY  LMP (unknown)  2TUS (3/28/23 at 19w0d) -- KEKE 23    OB HISTORY  OB History    Para Term  AB Living   2 1 1     1   SAB IAB Ectopic Molar Multiple Live Births             1      # Outcome Date GA Lbr Jose Enrique/2nd Weight Sex Delivery Anes PTL Lv   2 Current            1 Term 20 37w3d   M CS-Classical Spinal N MICHI      Complications: Two vessel cord, Fetal omphalocele during pregnancy, antepartum, IUGR (intrauterine growth  restriction) affecting care of mother, Gestational diabetes, Tobacco use affecting pregnancy, antepartum     GYN HISTORY  Denies h/o sexually transmitted infections/pelvic inflammatory disease  Denies h/o abnormal pap smears, last pap was none due to age  Denies h/o gynecologic surgeries, including biopsies of the cervix    PAST MEDICAL HISTORY  Past Medical History:   Diagnosis Date    Anemia     Anxiety     Asthma 2013    Attention deficit disorder 2020    dx'd in 1st grade, previously on adderall dx'd in 1st grade, previously on adderall    Bipolar disorder     Chlamydia     Depression     Gestational diabetes     Herpes     Myopia     Spondylolisthesis at L5-S1 level 2018    S/p spinal fusion 2018 ([facility])    Substance abuse      PAST SURGICAL HISTORY  Past Surgical History:   Procedure Laterality Date    ADENOIDECTOMY       SECTION      SPINAL FUSION      L5-S1    TONSILLECTOMY      WISDOM TOOTH EXTRACTION       FAMILY HISTORY  Family History   Problem Relation Age of Onset    Fibromyalgia Mother     Hypermobility Mother     Hypertension Mother     Irregular heart beat Mother     Bipolar disorder Mother     ADD / ADHD Mother     No Known Problems Father     ADD / ADHD Sister     Bipolar disorder Sister     Miscarriages / Stillbirths Sister     Asthma Brother     ADD / ADHD Brother     No Known Problems Maternal Grandmother     No Known Problems Maternal Grandfather     Cancer Paternal Grandmother     Cataracts Paternal Grandmother     Breast cancer Paternal Grandmother     Diabetes Paternal Grandfather     Cancer Paternal Grandfather     Ovarian cancer Neg Hx     Uterine cancer Neg Hx     Colon cancer Neg Hx      SOCIAL HISTORY  Social History     Socioeconomic History    Marital status: Single   Tobacco Use    Smoking status: Every Day     Packs/day: 0.25     Types: Cigarettes    Smokeless tobacco: Never    Tobacco comments:     1-2 cigarettes per day    Vaping Use    Vaping Use:  Never used   Substance and Sexual Activity    Alcohol use: No    Drug use: Not Currently    Sexual activity: Yes     Partners: Male     ALLERGIES  Allergies   Allergen Reactions    Prozac [Fluoxetine Hcl] Nausea And Vomiting    Lyrica [Pregabalin] Rash     HOME MEDICATIONS  Prior to Admission medications    Medication Sig Start Date End Date Taking? Authorizing Provider   Prenatal Vit-Fe Fumarate-FA (Prenatal ) 27-1 MG tablet tablet Take 1 tablet by mouth Daily. 23  Yes Tiki Cruz APRN   albuterol sulfate  (90 Base) MCG/ACT inhaler Inhale 2 puffs Every 4 (Four) Hours As Needed for Wheezing.    Provider, MD Opal   diphenhydrAMINE (BENADRYL) 25 MG tablet Take 1 tablet by mouth Every 6 (Six) Hours As Needed for Itching. 23   Jefferson Prince MD   ferrous sulfate 325 (65 FE) MG tablet Take 1 tablet by mouth 2 (Two) Times a Day. 23   Lolita Miles MD   predniSONE (DELTASONE) 20 MG tablet Take 1 tablet by mouth Daily. 23   Jefferson Prince MD     PHYSICAL EXAM  /74   Wt 91.9 kg (202 lb 9.6 oz)   LMP  (LMP Unknown)   BMI 34.78 kg/m²   General: No acute distress.  Well developed, well nourished.  Pleasant.  Heart: Regular rate and rhythm.   Lungs: No increased work of breathing  Abdomen: Soft, nontender to palpation, enlarged by gravid uterus.  Extremities: Mild edema noted bilaterally.    NST Review  NST on presentation    Prelim US: breech, ALIRIO 14.96 cm,  bpm, posterior placenta, EFW 92.7%ile, AC >97%ile, pyelectasis L 0.67 cm, R 0.45 cm, 2597 g 5#12oz     IMPRESSION  Steve Fried is a 22 y.o.  at 33w1d scheduled for RCS on 36+0 due to prior classical CS.    PLAN  1.  IUP at 33w1d with scheduled RCS at 36 weeks due to prior classical CS  - Admit: Labor and Delivery  - Attending: Dr. Washington  - Condition: Stable  - Vitals: per protocol  - Activity: ad nolan  - Nursing: NST prior to surgery  - Diet: NPO  - IV  fluids:  mL/hr  - Allergies:   Allergies   Allergen Reactions    Prozac [Fluoxetine Hcl] Nausea And Vomiting    Lyrica [Pregabalin] Rash   - Labs: CBC, T&S, UDS  - GBS: negative.  Antibiotics not indicated.  - Ancef 2g prior to skin incision  - Patient consented for  section.  Reviewed risks and benefits to include injury to surrounding organs (bowel, bladder, ureters, blood vessels, nerves, baby), infection, bleeding (possibly requiring blood transfusion and/or hysterectomy), and maternal/fetal death.    - Steve Fried and I have discussed pain goals for this hospitalization after reviewing her current clinical condition, medical history and prior pain experiences.  The goal is to keep her pain level appropriate.  To help achieve this, schedule Tylenol and NSAIDS, +/- Duramorph or Oxycodone    2. Inadequate PNC  - Abnormal 1 hr, no 3 hr  - POC glucose on admission  - Did receive BMZ on  and  ahead of planned RCS due to  status, scheduled prior to GCT        This document has been electronically signed by Joann Washington DO on 2023 20:44 CDT

## 2023-07-25 NOTE — PLAN OF CARE
Goal Outcome Evaluation:  Plan of Care Reviewed With: patient        Progress: improving  Outcome Evaluation: VSS. FF, lochia light. catheter in place with adequate output. pt stand up at bedside and marched in place. pain controlled with schedule meds. baby boy in NICU

## 2023-07-25 NOTE — L&D DELIVERY NOTE
[facility]   Repeat  Delivery Note    Patient Name: Steve Fried  : 2000  MRN: [id number]    Date of Delivery: 2023     Diagnosis     Pre & Post-Delivery:  Intrauterine pregnancy at 36w0d  Labor status:  Without Labor     History of classical  section    Family history of chromosomal anomaly    Spondylolisthesis at L5-S1 level    Overweight    Insufficient prenatal care in third trimester    Drug use affecting pregnancy in third trimester    High risk multigravida in third trimester    Family history of birth defects    Bartholin's gland abscess    Herpes virus infection in mother during third trimester of pregnancy    Status post repeat low transverse  section             Problem List    Transfer to Postpartum     Review the Delivery Report for details.     Delivery     Delivery: , Low Transverse     YOB: 2021    Time of Birth:  Gestational Age 7:46 AM   36w0d     Anesthesia: Spinal     Delivering clinician: Joann Washington    Forceps?   No   Vacuum? No    Shoulder dystocia present: No        Delivery narrative:  see operative report      Infant     Findings: male  infant     Infant observations: Weight: 3510 g (7 lb 11.8 oz)   Length: 20  in  Observations/Comments:  NICU      Apgars: 8  @ 1 minute /    9  @ 5 minutes   Infant Name: Undecided baby boy     Placenta & Cord         Placenta delivered  Manual removal  at   2023  7:48 AM     Cord: 3 vessels  present.   Nuchal Cord?  no   Cord blood obtained: Yes    Cord gases obtained:  No    Cord gas results: Venous:  No results found for: PHCVEN    Arterial:  No results found for: PHCART     Repair     Episiotomy: Not recorded     No    Lacerations: No   Estimated Blood Loss:       Quantitative Blood Loss: Quantitative Blood Loss (mL): 902 mL (23 1044)        Complications     none    Disposition     Mother to Mother Baby/Postpartum  in stable condition currently.  Baby to  remains with mom  in stable condition currently.    Joann Washington DO  07/25/23  17:22 CDT

## 2023-07-25 NOTE — L&D DELIVERY NOTE
[facility]  Operative Report    Name: Steve Fried  MRN: [id number]  Date: 2023  CSN: [id number]      Location: [facility] LABOR DELIVERY    Service: Obstetrics    Pre-op Diagnosis: pregnancy at 36 weeks 0 days gestation, h/o classical , h/o child with omphalocele, h/o HSV, h/o Bartholins abscess, h/o marijuana use in pregnancy, familial h/o Charge syndrome, h/o spinal fusion, insufficient prenatal care with abnormal 1 hour glucose test and no 3 hour, breech position    Post-op Diagnosis: same, status post repeat low transverse     Surgeon: Joann Washington DO    Assistant: Emi Barrios, CST was responsible for performing the following activities: Retraction, Suction, Irrigation, Closing, Placing Dressing, and Delivery of Fetus and their skilled assistance was necessary for the success of this case.    Staff:  Circulator: Santhosh Thibodeaux RN  Scrub Person: Traci Jimenez  L & BARRY Nurse: Taye Boateng RN; Selena Green RN  Assistant: Emi Barrios CSFA    Anesthesia: Spinal    Anesthesia Staff:  CRNA: Suha Silveira CRNA    Operation: Repeat low transverse  delivery, ELIJAH block    Drains: shultz    Complications: none    Findings: normal uterus, bilateral fallopian tubes, bilateral ovaries    Condition: stable    Specimens/Disposition: cord blood    Estimated Blood Loss: 400 mL  IV Fluids: 1000 mL  Urine Output: 400 mL    Indications: Steve Fried, 22 y.o.,  with History of prior classical CS presenting for RCS at 36+0.    Description of Operation:  The patient was identified and the procedure verified as a  section delivery.  The patient was given spinal with Duramorph for anesthesia.  Patient prepared and draped in normal sterile fashion in dorsal supine position with a leftward tilt.  A transverse skin incision was made with the scalpel and carried down through the subcutaneous tissue to the fascia using the Bovie.   Fascial incision was made with the Bovie and extended transversely with Haney scissors.  The superior aspect of the fascia was grasped with Kocher clamps and the rectus muscle was dissected off bluntly and sharply with Haney scissors.  The inferior aspect of the fascia was then grasped with Kocher clamps and the rectus muscle and pyramidalis were dissected off bluntly and then sharply with Haney scissors.  The rectus muscle was then  in the midline and the peritoneum was identified and entered bluntly.  The peritoneal incision was extended transversely.  Bladder blade and retractor were inserted.  The uterovesical peritoneal reflection was identified and incised transversely with Metzenbaum scissors.  The bladder flap was bluntly freed from the lower uterine segment. The bladder blade was adjusted.  A low transverse uterine incision was made with a scalpel and the uterine incision was extended with upward traction.  The amniotic sac was ruptured with an Allis clamp.  Delivered from breech presentation was a live male  weighing 3510 grams with Apgar scores of 8 at one minute and 9 at five minutes.  Delivered via normal breech maneuvers.  Cord clamped and cut and infant with bulb suction were handed to awaiting staff.  Cord blood was obtained and sent.     The placenta was removed intact and appeared normal.  Uterus exteriorized and cleared of all clots and debris.  The uterus, tubes and ovaries appeared normal.  The uterine incision was closed with running locked sutures of 0-Monocryl, and a second layer of the same was used to imbricate the incision.    Posterior cul-de-sac was cleared.  Uterus was reinserted atraumatically.  Hemostasis was observed.  Bilateral paracolic gutters cleared.  Inspection of the abdomen and pelvis prior to abdominal wall closure revealed no evidence of retained instruments or sponges.  The fascia was then reapproximated with running sutures of 0-Vicryl.  A ELIJAH block was  then placed with 30 cc of Ropivacaine at the superior fascial edge divided between the left and right. Subcutaneous layer closed with 2-0 plain gut.  The skin was reapproximated with 3-0 Monocryl in subcuticular fashion.  Prineo dressing applied.    Estimated blood loss was 400 mL.  Sponge, needle and instrument counts were correct x2.  There were no intraoperative complications and patient tolerated the procedure well.  The patient was escorted to her room in stable condition.     The patient received Cefazolin 2g for antibiotic prophylaxis prior to the start of the procedure.        This document has been electronically signed by Joann Washington DO on July 25, 2023 08:13 CDT

## 2023-07-25 NOTE — ANESTHESIA PREPROCEDURE EVALUATION
Anesthesia Evaluation     Patient summary reviewed and Nursing notes reviewed   no history of anesthetic complications:   NPO Solid Status: > 8 hours  NPO Liquid Status: > 2 hours           Airway   Mallampati: III  TM distance: >3 FB  Neck ROM: full  Possible difficult intubation  Dental - normal exam     Pulmonary - normal exam   (+) a smoker Current, asthma,  Cardiovascular - negative cardio ROS and normal exam    (-) hypertension      Neuro/Psych  (+) psychiatric history Depression and ADD  (-) seizures  GI/Hepatic/Renal/Endo - negative ROS   (-) diabetes    Musculoskeletal (-) negative ROS    Abdominal    Substance History - negative use     OB/GYN    (+) Pregnant  (-) Preeclampsia and history of pregnancy induced hypertension        Other - negative ROS                     Anesthesia Plan    ASA 2     spinal       Anesthetic plan, risks, benefits, and alternatives have been provided, discussed and informed consent has been obtained with: patient and spouse/significant other.    Use of blood products discussed with patient  Consented to blood products.      CODE STATUS:

## 2023-07-25 NOTE — H&P (VIEW-ONLY)
HISTORY & PHYSICAL - Obstetrics  [facility]    Name: Steve Fried  MRN: [id number]  Location: Room/bed info not found  Date: 2023  CSN: [id number]      CHIEF COMPLAINT: Routine PNC,  section    HISTORY OF PRESENT ILLNESS  Steve Fried is a 22 y.o.  at 33w1d gestational age by 2TUS suggesting Estimated Date of Delivery: 23.  The patient is scheduled for a  section at 36 weeks due to prior classical CS due to child with omphalocele.  Denies LOF.  Denies vaginal bleeding.  Denies contraction.  Regular FM.    Patient denies any chest pain, palpitations, headaches, lightheadedness, shortness of breath, cough, nausea, vomiting, diarrhea, constipation, fever, or chills.    ROS  Review of Systems   Constitutional: Negative.    HENT: Negative.     Respiratory: Negative.     Cardiovascular: Negative.    Gastrointestinal: Negative.    Genitourinary: Negative.    Skin: Negative.    Psychiatric/Behavioral: Negative.       PRENATAL LAB RESULTS  Prenatal labs reviewed.    External Prenatal Results       Pregnancy Outside Results - Transcribed From Office Records - See Scanned Records For Details       Test Value Date Time    ABO  A  23 1309    Rh  Positive  23 1309    Antibody Screen  Negative  23 1309    Varicella IgG  Positive  20 1028    Rubella  3.32 index 23 1309    Hgb  10.4 g/dL 23 1104       11.2 g/dL 23 0903       12.1 g/dL 23 1309    Hct  29.4 % 23 1104       32.6 % 23 0903       35.4 % 23 1309    Glucose Fasting GTT       Glucose Tolerance Test 1 hour       Glucose Tolerance Test 3 hour       Gonorrhea (discrete)  Negative  23 1545       Negative  23 1309    Chlamydia (discrete)  Negative  23 1545       Negative  23 1309    RPR  Non-Reactive  23 1309    VDRL       Syphilis Antibody       HBsAg  Non-Reactive  23 1309    Herpes Simplex Virus PCR        Herpes Simplex VIrus Culture       HIV  Non-Reactive  01/25/23 1309    Hep C RNA Quant PCR       Hep C Antibody  Non-Reactive  01/25/23 1309    AFP       Group B Strep  Negative  07/12/23 1104    GBS Susceptibility to Clindamycin       GBS Susceptibility to Erythromycin       Fetal Fibronectin       Genetic Testing, Maternal Blood                 Drug Screening       Test Value Date Time    Urine Drug Screen       Amphetamine Screen  Negative  04/19/23 1545       Positive  01/25/23 1309       +POSITIVE+  01/25/23 1309    Barbiturate Screen  Negative  04/19/23 1545       Negative  01/25/23 1309    Benzodiazepine Screen  Negative  04/19/23 1545       Negative  01/25/23 1309    Methadone Screen  Negative  04/19/23 1545       Negative  01/25/23 1309    Phencyclidine Screen  Negative  04/19/23 1545       Negative  01/25/23 1309    Opiates Screen  Negative  04/19/23 1545       Negative  01/25/23 1309    THC Screen  Positive  04/19/23 1545       Positive  01/25/23 1309    Cocaine Screen       Propoxyphene Screen  Negative  04/19/23 1545       Negative  01/25/23 1309    Buprenorphine Screen  Negative  04/19/23 1545       Negative  01/25/23 1309    Methamphetamine Screen       Oxycodone Screen  Negative  04/19/23 1545       Negative  01/25/23 1309    Tricyclic Antidepressants Screen  Negative  04/19/23 1545       Negative  01/25/23 1309              Legend    ^: Historical                            PRENATAL RISK FACTORS  1/23 Problems (from 01/25/23 to present)       Problem Noted Resolved    Herpes virus infection in mother during third trimester of pregnancy 6/6/2023 by Lolita Miles MD No    Bartholin's gland abscess 4/20/2023 by Tiki Cruz APRN No    Insufficient prenatal care in third trimester 4/19/2023 by Tiki Cruz APRN No    Drug use affecting pregnancy in third trimester 4/19/2023 by Tiki Cruz APRN No    High risk multigravida in third  trimester 2023 by Tiki Cruz APRN No    Family history of birth defects 2023 by Tiki Cruz APRN No    History of classical  section 2023 by Tiki Cruz APRN No    History of herpes simplex infection 2020 by Jazmine Astorga MA No    Overview Addendum 2023 12:31 PM by Tiki Cruz APRN     Hx of genital HSV outbreak         Positive urine drug screen 2020 by Rosario Fatima, RN No    Overview Signed 2023  1:07 PM by Rosario Fatima, RN     Formatting of this note might be different from the original.  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  S/p spinal fusion 2018 ([facility])  Formatting of this note might be different from the original.  S/p spinal fusion 2018 ([facility])         Pyelectasis of fetus on prenatal ultrasound 2023 by Lolita Miles MD 2023 by Tiki Cruz APRN    Overview Signed 2023 12:20 PM by Lolita Miles MD     - R kidney 6 mm                 DATING SUMMARY  LMP (unknown)  2TUS (3/28/23 at 19w0d) -- KEKE 23    OB HISTORY  OB History    Para Term  AB Living   2 1 1     1   SAB IAB Ectopic Molar Multiple Live Births             1      # Outcome Date GA Lbr Jose Enrique/2nd Weight Sex Delivery Anes PTL Lv   2 Current            1 Term 20 37w3d   M CS-Classical Spinal N MICHI      Complications: Two vessel cord, Fetal omphalocele during pregnancy, antepartum, IUGR (intrauterine growth  restriction) affecting care of mother, Gestational diabetes, Tobacco use affecting pregnancy, antepartum     GYN HISTORY  Denies h/o sexually transmitted infections/pelvic inflammatory disease  Denies h/o abnormal pap smears, last pap was none due to age  Denies h/o gynecologic surgeries, including biopsies of the cervix    PAST MEDICAL HISTORY  Past Medical History:   Diagnosis Date    Anemia     Anxiety     Asthma 2013    Attention deficit disorder 2020    dx'd in 1st grade, previously on adderall dx'd in 1st grade, previously on adderall    Bipolar disorder     Chlamydia     Depression     Gestational diabetes     Herpes     Myopia     Spondylolisthesis at L5-S1 level 2018    S/p spinal fusion 2018 ([facility])    Substance abuse      PAST SURGICAL HISTORY  Past Surgical History:   Procedure Laterality Date    ADENOIDECTOMY       SECTION      SPINAL FUSION      L5-S1    TONSILLECTOMY      WISDOM TOOTH EXTRACTION       FAMILY HISTORY  Family History   Problem Relation Age of Onset    Fibromyalgia Mother     Hypermobility Mother     Hypertension Mother     Irregular heart beat Mother     Bipolar disorder Mother     ADD / ADHD Mother     No Known Problems Father     ADD / ADHD Sister     Bipolar disorder Sister     Miscarriages / Stillbirths Sister     Asthma Brother     ADD / ADHD Brother     No Known Problems Maternal Grandmother     No Known Problems Maternal Grandfather     Cancer Paternal Grandmother     Cataracts Paternal Grandmother     Breast cancer Paternal Grandmother     Diabetes Paternal Grandfather     Cancer Paternal Grandfather     Ovarian cancer Neg Hx     Uterine cancer Neg Hx     Colon cancer Neg Hx      SOCIAL HISTORY  Social History     Socioeconomic History    Marital status: Single   Tobacco Use    Smoking status: Every Day     Packs/day: 0.25     Types: Cigarettes    Smokeless tobacco: Never    Tobacco comments:     1-2 cigarettes per day    Vaping Use    Vaping Use:  Never used   Substance and Sexual Activity    Alcohol use: No    Drug use: Not Currently    Sexual activity: Yes     Partners: Male     ALLERGIES  Allergies   Allergen Reactions    Prozac [Fluoxetine Hcl] Nausea And Vomiting    Lyrica [Pregabalin] Rash     HOME MEDICATIONS  Prior to Admission medications    Medication Sig Start Date End Date Taking? Authorizing Provider   Prenatal Vit-Fe Fumarate-FA (Prenatal ) 27-1 MG tablet tablet Take 1 tablet by mouth Daily. 23  Yes Tiki Cruz APRN   albuterol sulfate  (90 Base) MCG/ACT inhaler Inhale 2 puffs Every 4 (Four) Hours As Needed for Wheezing.    Provider, MD Opal   diphenhydrAMINE (BENADRYL) 25 MG tablet Take 1 tablet by mouth Every 6 (Six) Hours As Needed for Itching. 23   Jefferson Prince MD   ferrous sulfate 325 (65 FE) MG tablet Take 1 tablet by mouth 2 (Two) Times a Day. 23   Lolita Miles MD   predniSONE (DELTASONE) 20 MG tablet Take 1 tablet by mouth Daily. 23   Jefferson Prince MD     PHYSICAL EXAM  /74   Wt 91.9 kg (202 lb 9.6 oz)   LMP  (LMP Unknown)   BMI 34.78 kg/m²   General: No acute distress.  Well developed, well nourished.  Pleasant.  Heart: Regular rate and rhythm.   Lungs: No increased work of breathing  Abdomen: Soft, nontender to palpation, enlarged by gravid uterus.  Extremities: Mild edema noted bilaterally.    NST Review  NST on presentation    Prelim US: breech, ALIRIO 14.96 cm,  bpm, posterior placenta, EFW 92.7%ile, AC >97%ile, pyelectasis L 0.67 cm, R 0.45 cm, 2597 g 5#12oz     IMPRESSION  Steve Fried is a 22 y.o.  at 33w1d scheduled for RCS on 36+0 due to prior classical CS.    PLAN  1.  IUP at 33w1d with scheduled RCS at 36 weeks due to prior classical CS  - Admit: Labor and Delivery  - Attending: Dr. Washington  - Condition: Stable  - Vitals: per protocol  - Activity: ad nolan  - Nursing: NST prior to surgery  - Diet: NPO  - IV  fluids:  mL/hr  - Allergies:   Allergies   Allergen Reactions    Prozac [Fluoxetine Hcl] Nausea And Vomiting    Lyrica [Pregabalin] Rash   - Labs: CBC, T&S, UDS  - GBS: negative.  Antibiotics not indicated.  - Ancef 2g prior to skin incision  - Patient consented for  section.  Reviewed risks and benefits to include injury to surrounding organs (bowel, bladder, ureters, blood vessels, nerves, baby), infection, bleeding (possibly requiring blood transfusion and/or hysterectomy), and maternal/fetal death.    - Steve Fried and I have discussed pain goals for this hospitalization after reviewing her current clinical condition, medical history and prior pain experiences.  The goal is to keep her pain level appropriate.  To help achieve this, schedule Tylenol and NSAIDS, +/- Duramorph or Oxycodone    2. Inadequate PNC  - Abnormal 1 hr, no 3 hr  - POC glucose on admission  - Did receive BMZ on  and  ahead of planned RCS due to  status, scheduled prior to GCT        This document has been electronically signed by Joann Washington DO on 2023 20:44 CDT

## 2023-07-25 NOTE — ANESTHESIA POSTPROCEDURE EVALUATION
Patient: Steve Fried    Procedure Summary       Date: 23 Room / Location: [facility] LABOR DELIVERY  [facility] LABOR DELIVERY    Anesthesia Start: 709 Anesthesia Stop: 837    Procedure:  SECTION REPEAT (Abdomen) Diagnosis:     Surgeons: Joann Washington DO Provider: Suha Silveira CRNA    Anesthesia Type: spinal ASA Status: 2            Anesthesia Type: spinal    Vitals  Vitals Value Taken Time   BP 99/55 23 0836   Temp     Pulse 70 2336   Resp     SpO2 100 % 23   Vitals shown include unvalidated device data.        Post Anesthesia Care and Evaluation    Patient location during evaluation: bedside  Patient participation: complete - patient participated  Level of consciousness: awake  Pain score: 0  Pain management: adequate    Airway patency: patent  Anesthetic complications: No anesthetic complications  PONV Status: none  Cardiovascular status: acceptable and hemodynamically stable  Respiratory status: acceptable, room air and spontaneous ventilation  Hydration status: acceptable  Post Neuraxial Block status: No signs or symptoms of PDPH

## 2023-07-25 NOTE — ANESTHESIA PROCEDURE NOTES
Spinal Block      Patient reassessed immediately prior to procedure    Patient location during procedure: OR  Start Time: 7/25/2023 7:13 AM  Stop Time: 7/25/2023 7:18 AM  Indication:at surgeon's request and post-op pain management  Performed By  CRNA/CAA: Suha Silveira, CRNA  Preanesthetic Checklist  Completed: patient identified, IV checked, site marked, risks and benefits discussed, surgical consent, monitors and equipment checked, pre-op evaluation and timeout performed  Spinal Block Prep:  Patient Position:sitting  Sterile Tech:cap, gloves, mask and sterile barriers  Prep:Betadine  Patient Monitoring:blood pressure monitoring and continuous pulse oximetry    Spinal Block Procedure  Approach:midline  Guidance:landmark technique  Location:L2-L3  Needle Type:Pencan  Needle Gauge:24 G  Placement of Spinal needle event:cerebrospinal fluid aspirated  Paresthesia: no  Fluid Appearance:clear  Medications: bupivacaine PF (MARCAINE) 0.75 % injection - Epidural   1.4 mL - 7/25/2023 7:18:00 AM   Post Assessment  Patient Tolerance:patient tolerated the procedure well with no apparent complications  Complications no

## 2023-07-25 NOTE — INTERVAL H&P NOTE
"23 yo  at 36+0 presenting for RCS due to h/o classical c/s secondary to child with omphalocele. No ctx, VB, LOF. Good FM. No concerns. Excited. Did come for BMZ injections on  and . Inadequate PNC. Missed portions of PNC. Abnormal 1 hr/no 3 hr    Vitals:    23 0513   BP: 137/65   BP Location: Right arm   Patient Position: Sitting   Pulse: 107   Resp: 18   Temp: 98.7 °F (37.1 °C)   TempSrc: Oral   SpO2: 98%   Weight: 92.1 kg (203 lb)   Height: 162.6 cm (64\")     Gen: well appearing, NAD  CV: RRR  Chest: no increased work of breathing  Abd: nontender, gravid  Ext: nontender, no edema    23 yo  at 36+0 presenting for RCS due to h/o classical c/s secondary to child with omphalocele.   Undecided contraception, but interested  To OR for scheduled CS  POC glucose 132  NICU to be present.        This document has been electronically signed by Joann Washington DO on 2023 07:19 CDT    "

## 2023-07-26 LAB
HCT VFR BLD AUTO: 25.1 % (ref 34–46.6)
HGB BLD-MCNC: 8.5 G/DL (ref 12–15.9)

## 2023-07-26 PROCEDURE — 85014 HEMATOCRIT: CPT | Performed by: STUDENT IN AN ORGANIZED HEALTH CARE EDUCATION/TRAINING PROGRAM

## 2023-07-26 PROCEDURE — 85018 HEMOGLOBIN: CPT | Performed by: STUDENT IN AN ORGANIZED HEALTH CARE EDUCATION/TRAINING PROGRAM

## 2023-07-26 PROCEDURE — 25010000002 KETOROLAC TROMETHAMINE PER 15 MG: Performed by: STUDENT IN AN ORGANIZED HEALTH CARE EDUCATION/TRAINING PROGRAM

## 2023-07-26 RX ADMIN — KETOROLAC TROMETHAMINE 15 MG: 15 INJECTION, SOLUTION INTRAMUSCULAR; INTRAVENOUS at 11:46

## 2023-07-26 RX ADMIN — SIMETHICONE 80 MG: 80 TABLET, CHEWABLE ORAL at 08:38

## 2023-07-26 RX ADMIN — IBUPROFEN 600 MG: 600 TABLET, FILM COATED ORAL at 23:48

## 2023-07-26 RX ADMIN — PRENATAL VIT W/ FE FUMARATE-FA TAB 27-0.8 MG 1 TABLET: 27-0.8 TAB at 08:38

## 2023-07-26 RX ADMIN — SIMETHICONE 80 MG: 80 TABLET, CHEWABLE ORAL at 20:40

## 2023-07-26 RX ADMIN — SIMETHICONE 80 MG: 80 TABLET, CHEWABLE ORAL at 11:46

## 2023-07-26 RX ADMIN — ACETAMINOPHEN 1000 MG: 500 TABLET, FILM COATED ORAL at 08:38

## 2023-07-26 RX ADMIN — ACETAMINOPHEN 650 MG: 325 TABLET, FILM COATED ORAL at 20:40

## 2023-07-26 RX ADMIN — ACETAMINOPHEN 650 MG: 325 TABLET, FILM COATED ORAL at 14:40

## 2023-07-26 RX ADMIN — IBUPROFEN 600 MG: 600 TABLET, FILM COATED ORAL at 17:47

## 2023-07-26 RX ADMIN — ACETAMINOPHEN 1000 MG: 500 TABLET, FILM COATED ORAL at 01:45

## 2023-07-26 RX ADMIN — KETOROLAC TROMETHAMINE 15 MG: 15 INJECTION, SOLUTION INTRAMUSCULAR; INTRAVENOUS at 04:48

## 2023-07-26 RX ADMIN — OXYCODONE HYDROCHLORIDE 5 MG: 5 TABLET ORAL at 13:15

## 2023-07-26 RX ADMIN — SIMETHICONE 80 MG: 80 TABLET, CHEWABLE ORAL at 17:46

## 2023-07-26 NOTE — PROGRESS NOTES
"[facility]  Progress Note - Obstetrics    Name: Steve Fried  MRN: [id number]  Location: M75  Date: 2023  CSN: [id number]    POD #1 s/p RLTCS at 36w0d secondary to history of classical  delivery    Patient seen and examined.  No complaints.  Pain well controlled.  Tolerating diet.  No nausea or vomiting.  No headache, dizziness, chest pain, or shortness of breath.  Passing flatus, normal bowel movement.  Up and out of bed and ambulating.  Voiding without difficulty.  Doss is out . Patient is ambulating lochia minimal.  Bottlefeeding    PHYSICAL EXAM  /51 (BP Location: Left arm, Patient Position: Lying)   Pulse 57   Temp 97.6 °F (36.4 °C) (Oral)   Resp 18   Ht 162.6 cm (64\")   Wt 92.1 kg (203 lb)   LMP  (LMP Unknown)   SpO2 97%   Breastfeeding No   BMI 34.84 kg/m²   General: No apparent distress.  Alert and cooperative.  Pleasant.  Heart: Regular rate and rhythm.  No murmurs, rubs, or gallops.  Lungs: Clear to auscultation bilaterally.  No wheezes, rales, or rhonchi.  Abdomen: Soft.  Non-tender to palpation.  No rebound tenderness or guarding.  +BS.  Dressing/Incision: Clean, dry, and intact.  No erythema or warmth.    Extremities: +2/4 posterior tibial.  No pitting edema in lower extremities.  No calf tenderness.  Uterus: Uterine fundus firm, non-tender and below umbilicus.      Intake/Output Summary (Last 24 hours) at 2023 0734  Last data filed at 2023 1928  Gross per 24 hour   Intake 1000 ml   Output 2802 ml   Net -1802 ml     LABS  Hgb: 10.5-> 8.5    IMPRESSION  Steve Fried is a 22 y.o.  POD #1 s/p RLTCS at 36w0d secondary to history of classical  delivery doing well and recovering appropriately.  Baby in NICU    PLAN  1.  Following surgery  - Continue routine post op care  - Encourage OOB and ambulation  - Encourage incentive spirometry  - Diet: Pregnancy/breastfeeding  - DVT prophylaxis: Ambulation  - " Contraception: Decided  -Bottle feeding  - Discharge patient home postop day 2-4.  1 week, 6 week follow-up for postpartum.    2.        [facility]  [address] [place] [address]  Office: [phone]      This document has been electronically signed by Jillian Christian MD on July 26, 2023 07:34 CDT.

## 2023-07-26 NOTE — PLAN OF CARE
Goal Outcome Evaluation:  Plan of Care Reviewed With: patient           Outcome Evaluation: VSS, FF, voids, lochia light, ambulates in room and to nicu once, pain controlled with scheduled meds

## 2023-07-26 NOTE — PLAN OF CARE
Goal Outcome Evaluation:  Plan of Care Reviewed With: patient        Progress: improving  Outcome Evaluation: VSS; fundus firm, bleeding light.  voids, ambulates in room and to NICU.  pain controlled with scheduled and prn pain meds.  Incision WNL.

## 2023-07-27 VITALS
SYSTOLIC BLOOD PRESSURE: 118 MMHG | RESPIRATION RATE: 18 BRPM | HEIGHT: 64 IN | WEIGHT: 203 LBS | HEART RATE: 86 BPM | DIASTOLIC BLOOD PRESSURE: 60 MMHG | TEMPERATURE: 98.3 F | BODY MASS INDEX: 34.66 KG/M2 | OXYGEN SATURATION: 98 %

## 2023-07-27 PROCEDURE — 0503F POSTPARTUM CARE VISIT: CPT | Performed by: OBSTETRICS & GYNECOLOGY

## 2023-07-27 RX ORDER — ACETAMINOPHEN 325 MG/1
650 TABLET ORAL EVERY 6 HOURS
Qty: 30 TABLET | Refills: 1 | Status: SHIPPED | OUTPATIENT
Start: 2023-07-27

## 2023-07-27 RX ORDER — PSEUDOEPHEDRINE HCL 30 MG
100 TABLET ORAL 2 TIMES DAILY PRN
Qty: 30 CAPSULE | Refills: 0 | Status: SHIPPED | OUTPATIENT
Start: 2023-07-27 | End: 2023-08-01

## 2023-07-27 RX ORDER — OXYCODONE HYDROCHLORIDE 5 MG/1
5 TABLET ORAL EVERY 6 HOURS PRN
Qty: 10 TABLET | Refills: 0 | Status: SHIPPED | OUTPATIENT
Start: 2023-07-27 | End: 2023-08-01

## 2023-07-27 RX ORDER — IBUPROFEN 600 MG/1
600 TABLET ORAL EVERY 6 HOURS
Qty: 30 TABLET | Refills: 1 | Status: SHIPPED | OUTPATIENT
Start: 2023-07-27

## 2023-07-27 RX ADMIN — SIMETHICONE 80 MG: 80 TABLET, CHEWABLE ORAL at 08:49

## 2023-07-27 RX ADMIN — ACETAMINOPHEN 650 MG: 325 TABLET, FILM COATED ORAL at 01:58

## 2023-07-27 RX ADMIN — ACETAMINOPHEN 650 MG: 325 TABLET, FILM COATED ORAL at 08:48

## 2023-07-27 RX ADMIN — PRENATAL VIT W/ FE FUMARATE-FA TAB 27-0.8 MG 1 TABLET: 27-0.8 TAB at 08:49

## 2023-07-27 RX ADMIN — IBUPROFEN 600 MG: 600 TABLET, FILM COATED ORAL at 04:54

## 2023-07-27 NOTE — DISCHARGE INSTR - ACTIVITY
"Notify Dr of... heavy bleeding, passing of clots, foul odor to your discharge, temperature above 100.4, burning when urinating, gapping or drainage from laceration, or for pain not relieved by taking pain medication, redness or streaking in breasts, pain or redness in legs.    Take all medications as prescribed.  Take rest periods several times during the day.  \"Baby Blues\" are normal and may be present around the 3rd-4th day after delivery. If they last longer than 2-3 days, please let your Dr know.  Pelvic rest for 6 weeks. No douching, tampons, or intercourse  No driving while taking pain medication  No lifting anything heavier than the baby for 2 weeks  Wear a good supportive bra 24 hours/day to prevent engorgement   "

## 2023-07-27 NOTE — PROGRESS NOTES
"[facility]  Progress Note - Obstetrics    Name: Steve Fried  MRN: [id number]  Location: M757/1  Date: 2023  CSN: [id number]       POD #2 s/p LTCS at 36w0d secondary to history of classical  delivery     Patient seen and examined.  Denies headache, dizziness, chest pain, shortness of breath, nausea, vomiting.  Minimal/moderate lochia.  OOB and ambulating.  Tolerating regular diet.  Voiding without difficulty.  Bottle feeding.    PHYSICAL EXAM  BP 99/54 (BP Location: Left arm, Patient Position: Lying)   Pulse 72   Temp 97.8 °F (36.6 °C) (Oral)   Resp 18   Ht 162.6 cm (64\")   Wt 92.1 kg (203 lb)   LMP  (LMP Unknown)   SpO2 99%   Breastfeeding No   BMI 34.84 kg/m²   General: AAOx3, no apparent distress.  Lungs: CTA B/L anteriorly, no wheezes, rales, rhonchi.  CV: Acceptable rate, regular rhythm, S1/S2 normal.  Abdomen: +BS, soft, nondistended, uterine fundus firm, nontender, and below umbilicus.  Dressing/Incision: Clean, dry, and intact. No erythema or warmth.   Lower extremities: no bilateral edema.  2+/4+ dorsalis pedis pulses B/L.  No intake or output data in the 24 hours ending 23 0648    IMPRESSION  Steve Fried is a 22 y.o.  POD #2 s/p  at 36w0d secondary to history of classical  delivery.  Doing well and recovering appropriately. Patient is not ready to go home yet. Baby is in NICU.     PLAN  1.  Postpartum s/p   - Continue routine postpartum care.  - Diet: Pregnancy/breastfeeding  - DVT prophylaxis: Ambulation   - Bottle feeding  - Contraception: Depo Shot  - Discharge to home 2-4 days post op  Follow-up in 2 weeks (telephone visit), 6 weeks.     2.            [facility]  [address] [place] [address]  Office: [phone]  This document has been electronically signed by Jillian Christian MD on 2023 06:48 CDT.  " patient with Dr. Mejia and was present during the key portion of the E/M service. I agree with the history and exam as documented by the resident. Ultimately due to planned continued admission of infant, patient desired to DC home. Otherwise, I agree with the assessment and plan as outlined above.        This document has been electronically signed by Joann Washington DO on August 16, 2023 20:25 CDT

## 2023-07-27 NOTE — DISCHARGE SUMMARY
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  Pre pregnancy wt 220 lb  Formatting of this note might be different from the original.  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  S/p spinal fusion 2018 ([facility])  Formatting of this note might be different from the original.  S/p spinal fusion 2018 ([facility])        Procedures Performed: Procedure(s):   SECTION REPEAT   Brief History: Patient is a 22 y.o. now  who presented to labor and delivery at 36w0d for repeat  section secondary to history of classical  section.   Hospital Course: Patient presented for delivery.  She had a RLTCS.  Her postoperative course was unremarkable.  On POD #2 she expressed the desire for discharge although  "her baby is in the NICU due to her other baby at home missing her.  She had passed gas and was urinating normally.  She was eating a regular diet without difficulty.  She was ambulating well.  Her incision was clean, dry and intact.  Discharge instructions were given.  All questions were answered   Condition:  Discharge Activity: Stable  Activity Instructions       Bathing Restrictions      Type of Restriction: Bathing    Bathing Restrictions: Other    Explain Bathing Restrictions: No soaking in bathtub for 4 weeks/ Showers are fine.    Driving Restrictions      Type of Restriction: Driving    Driving Restrictions: No Driving (Time Limited)    Length: Other    Indicate Length of Restriction: No driving for 1 week or while on narcotic pain medications. You must be able to quickly press on the brake before driving. Riding is car is fine.    Lifting Restrictions      Type of Restriction: Lifting    Lifting Restrictions: Other    Explain Lifing Restrictions: No lifting more than infant and baby carrier together for 6 weeks.    Pelvic Rest      Nothing in the vagina for 6 weeks to include tampons, douching, or sexual intercourse.    Sexual Activity Restrictions      Type of Restriction: Sex    Explain Sexual Activity Restrictions: No sexual intercourse, tampon use, or douching for at least 6 weeks   Additional Activity Instructions:    Notify Dr of... heavy bleeding, passing of clots, foul odor to your discharge, temperature above 100.4, burning when urinating, gapping or drainage from laceration, or for pain not relieved by taking pain medication, redness or streaking in breasts, pain or redness in legs.    Take all medications as prescribed.  Take rest periods several times during the day.  \"Baby Blues\" are normal and may be present around the 3rd-4th day after delivery. If they last longer than 2-3 days, please let your Dr know.  Pelvic rest for 6 weeks. No douching, tampons, or intercourse  No driving while taking " pain medication  No lifting anything heavier than the baby for 2 weeks  Wear a good supportive bra 24 hours/day to prevent engorgement           Discharge Diet: Diet Instructions       Diet: Regular/House Diet      Discharge Diet: Regular/House Diet    Texture: Regular Texture (IDDSI 7)    Fluid Consistency: Thin (IDDSI 0)      regular          Discharge Medications:    Your medication list        START taking these medications        Instructions Last Dose Given Next Dose Due   acetaminophen 325 MG tablet  Commonly known as: TYLENOL      Take 2 tablets by mouth Every 6 (Six) Hours.       docusate sodium 100 MG capsule      Take 1 capsule by mouth 2 (Two) Times a Day As Needed for Constipation.       ibuprofen 600 MG tablet  Commonly known as: ADVIL,MOTRIN      Take 1 tablet by mouth Every 6 (Six) Hours.       oxyCODONE 5 MG immediate release tablet  Commonly known as: ROXICODONE      Take 1 tablet by mouth Every 6 (Six) Hours As Needed for Severe Pain for up to 5 days.              CONTINUE taking these medications        Instructions Last Dose Given Next Dose Due   albuterol sulfate  (90 Base) MCG/ACT inhaler  Commonly known as: PROVENTIL HFA;VENTOLIN HFA;PROAIR HFA      Inhale 2 puffs Every 4 (Four) Hours As Needed for Wheezing.       diphenhydrAMINE 25 MG tablet  Commonly known as: BENADRYL      Take 1 tablet by mouth Every 6 (Six) Hours As Needed for Itching.       ferrous sulfate 325 (65 FE) MG tablet      Take 1 tablet by mouth 2 (Two) Times a Day.       Prenatal 27-1 27-1 MG tablet tablet      Take 1 tablet by mouth Daily.                 Where to Get Your Medications        These medications were sent to [id number] - [facility] [address] AT [facility] - [phone] [facility] [phone]   [address] [address]      Phone: [phone]   acetaminophen 325 MG tablet  docusate sodium 100 MG capsule  ibuprofen 600 MG tablet  oxyCODONE 5 MG  immediate release tablet        Discharge Disposition: Home   Follow-up: No future appointments.  1 week PP visit  6 week PP visit     <30 minutes spent with the patient.    This document has been electronically signed by Lolita Miles MD on July 27, 2023 12:49 CDT.

## 2023-08-01 ENCOUNTER — POSTPARTUM VISIT (OUTPATIENT)
Dept: OBSTETRICS AND GYNECOLOGY | Facility: CLINIC | Age: 23
End: 2023-08-01
Payer: COMMERCIAL

## 2023-08-01 VITALS
WEIGHT: 186.6 LBS | SYSTOLIC BLOOD PRESSURE: 102 MMHG | BODY MASS INDEX: 31.86 KG/M2 | DIASTOLIC BLOOD PRESSURE: 58 MMHG | HEIGHT: 64 IN

## 2023-08-01 DIAGNOSIS — Z98.891 STATUS POST REPEAT LOW TRANSVERSE CESAREAN SECTION: ICD-10-CM

## 2023-08-01 DIAGNOSIS — Z30.42 SURVEILLANCE FOR DEPO-PROVERA CONTRACEPTION: ICD-10-CM

## 2023-08-01 RX ORDER — MEDROXYPROGESTERONE ACETATE 150 MG/ML
150 INJECTION, SUSPENSION INTRAMUSCULAR ONCE
Status: COMPLETED | OUTPATIENT
Start: 2023-08-01 | End: 2023-08-01

## 2023-08-01 RX ORDER — MEDROXYPROGESTERONE ACETATE 150 MG/ML
150 INJECTION, SUSPENSION INTRAMUSCULAR
Qty: 1 EACH | Refills: 3 | Status: SHIPPED | OUTPATIENT
Start: 2023-08-01

## 2023-08-01 RX ADMIN — MEDROXYPROGESTERONE ACETATE 150 MG: 150 INJECTION, SUSPENSION INTRAMUSCULAR at 11:26

## 2024-08-10 NOTE — PROGRESS NOTES
I spent approximately 50 minutes with the patient acquiring the health and history intake, reviewing prior records, discussing topics related to healthy pregnancy, entering orders, and documentation. Her LMP is unknown. She had a positive pregnancy test today in the office.  This is her 2nd pregnancy. She had a classical csection with her son at [place] on 5/26/20.  He had an omphalocele. Pregnancy was also complicated by 2 vessel cord and IUGR. She also had gestational diabetes.   The patient saw Tiki JACQUES in December. She had a negative beta HCG quant. Her hemoglobin A1C was 5.9. Tiki put the patient on Metformin. I consulted with Tiki today, and she said for the patient to stop taking Metformin. She wants the patient to do the early 1hr glucola. I spoke to the patient and she verbalized understanding.   A newob bag is given. The 1st trimester teaching was done with the patient. We discussed a healthy diet and exercise and what is recommended. I also discussed Listeriosis and Toxoplasmosis. She does not eat fish. I informed patient not to be in hot tubs, saunas, or tanning beds. We discussed that spotting may occur after intercourse which is common, but if heavy bleeding like a period occurs to call [facility] or hospital if clinic is closed.  I encouraged her to make an appointment with the dentist if she has not had a dental exam and cleaning in the last 6 months.  I instructed the patient that alcohol, illicit drug use, and tobacco smoking are not recommended in pregnancy. She is down to smoking 3 or 4 cigarettes daily. She is trying to quit. She currently denies any alcohol or drug use.  She plans to formula feed.  She filled out the depression screening questionnaire and scored 4. I encouraged the patient to get the TDAP vaccine in the 3rd trimester.  I discussed with the patient that a pediatrician needs to be chosen prior to delivery for the infant to have an appointment  [place] Pending    Insurance response  Prescription Drug Insurance:   Payer: [employer] [facility] [employer]     Notes: Prior authorization submitted - will update provider when decision has been made by insurance.     Current weight 74 kg 163 lbs     scheduled before leaving the hospital. Her son sees Dr. Pimentel.   I discussed lab tests will be done today. An early 1hr glucola is ordered.  All questions were answered at this time. She is scheduled for an ultrasound and to see Sujatha JACQUES on 2/15/23.

## (undated) DEVICE — STERILE POLYISOPRENE POWDER-FREE SURGICAL GLOVES WITH EMOLLIENT COATING: Brand: PROTEXIS

## (undated) DEVICE — TBG PENCL TELESCP MEGADYNE SMOKE EVAC 10FT

## (undated) DEVICE — PK C/SECT 60

## (undated) DEVICE — GARMENT,MEDLINE,DVT,INT,CALF,MED, GEN2: Brand: MEDLINE

## (undated) DEVICE — SYS CLS SKIN PREMIERPRO EXOFINFUSION 22CM

## (undated) DEVICE — SUT VIC 2/0 SH 27IN

## (undated) DEVICE — GLV SURG SIGNATURE ESSENTIAL PF LTX SZ6.5

## (undated) DEVICE — SUT  GUT PLAIN 2/0 CT1 27IN 843H

## (undated) DEVICE — SUT MNCRYL 3/0 Y936H

## (undated) DEVICE — TRY SPINE PENCAN 24GA X4IN

## (undated) DEVICE — HYDROGEL COATED LATEX URINE METER FOLEY TRAY,16 FR/CH (5.3 MM), 5 ML CATHETER PRE-CONNECTED TO 2000 ML DRAINAGE BAG WITH NEEDLE SAMPLING: Brand: DOVER

## (undated) DEVICE — SUT MNCRYL 0/0 CTX 36IN Y398H

## (undated) DEVICE — SUT VIC 0 CT1 36IN J946H

## (undated) DEVICE — SHEET,DRAPE,53X77,STERILE: Brand: MEDLINE